# Patient Record
Sex: FEMALE | Race: BLACK OR AFRICAN AMERICAN | NOT HISPANIC OR LATINO | Employment: UNEMPLOYED | URBAN - METROPOLITAN AREA
[De-identification: names, ages, dates, MRNs, and addresses within clinical notes are randomized per-mention and may not be internally consistent; named-entity substitution may affect disease eponyms.]

---

## 2024-02-21 ENCOUNTER — HOSPITAL ENCOUNTER (INPATIENT)
Facility: OTHER | Age: 44
LOS: 5 days | Discharge: HOME | DRG: 872 | End: 2024-02-26
Attending: HOSPITALIST | Admitting: HOSPITALIST
Payer: MEDICAID

## 2024-02-21 DIAGNOSIS — A41.9 SEPSIS: ICD-10-CM

## 2024-02-21 DIAGNOSIS — I45.81 ACQUIRED LONG QT SYNDROME: ICD-10-CM

## 2024-02-21 DIAGNOSIS — A41.9 SEPSIS DUE TO URINARY TRACT INFECTION: ICD-10-CM

## 2024-02-21 DIAGNOSIS — N10 ACUTE PYELONEPHRITIS: Primary | ICD-10-CM

## 2024-02-21 DIAGNOSIS — N39.0 SEPSIS DUE TO URINARY TRACT INFECTION: ICD-10-CM

## 2024-02-21 LAB
ALBUMIN SERPL BCP-MCNC: 2.3 G/DL (ref 3.5–5.2)
ALP SERPL-CCNC: 145 U/L (ref 55–135)
ALT SERPL W/O P-5'-P-CCNC: 39 U/L (ref 10–44)
ANION GAP SERPL CALC-SCNC: 9 MMOL/L (ref 8–16)
AST SERPL-CCNC: 27 U/L (ref 10–40)
B-HCG UR QL: NEGATIVE
BACTERIA #/AREA URNS HPF: ABNORMAL /HPF
BASOPHILS NFR BLD: 0 % (ref 0–1.9)
BILIRUB SERPL-MCNC: 1 MG/DL (ref 0.1–1)
BILIRUB UR QL STRIP: NEGATIVE
BUN SERPL-MCNC: 13 MG/DL (ref 6–20)
CALCIUM SERPL-MCNC: 8.4 MG/DL (ref 8.7–10.5)
CHLORIDE SERPL-SCNC: 110 MMOL/L (ref 95–110)
CLARITY UR: ABNORMAL
CO2 SERPL-SCNC: 22 MMOL/L (ref 23–29)
COLOR UR: YELLOW
CREAT SERPL-MCNC: 1.2 MG/DL (ref 0.5–1.4)
CTP QC/QA: YES
DIFFERENTIAL METHOD BLD: ABNORMAL
EOSINOPHIL NFR BLD: 0 % (ref 0–8)
ERYTHROCYTE [DISTWIDTH] IN BLOOD BY AUTOMATED COUNT: 15.2 % (ref 11.5–14.5)
EST. GFR  (NO RACE VARIABLE): 58 ML/MIN/1.73 M^2
GLUCOSE SERPL-MCNC: 103 MG/DL (ref 70–110)
GLUCOSE UR QL STRIP: NEGATIVE
HCT VFR BLD AUTO: 33.1 % (ref 37–48.5)
HGB BLD-MCNC: 11.1 G/DL (ref 12–16)
HGB UR QL STRIP: ABNORMAL
HYALINE CASTS #/AREA URNS LPF: 2 /LPF
IMM GRANULOCYTES # BLD AUTO: ABNORMAL K/UL (ref 0–0.04)
IMM GRANULOCYTES NFR BLD AUTO: ABNORMAL % (ref 0–0.5)
KETONES UR QL STRIP: NEGATIVE
LACTATE SERPL-SCNC: 1 MMOL/L (ref 0.5–2.2)
LEUKOCYTE ESTERASE UR QL STRIP: ABNORMAL
LIPASE SERPL-CCNC: 3 U/L (ref 4–60)
LYMPHOCYTES NFR BLD: 11 % (ref 18–48)
MCH RBC QN AUTO: 29.5 PG (ref 27–31)
MCHC RBC AUTO-ENTMCNC: 33.5 G/DL (ref 32–36)
MCV RBC AUTO: 88 FL (ref 82–98)
METAMYELOCYTES NFR BLD MANUAL: 1 %
MICROSCOPIC COMMENT: ABNORMAL
MONOCYTES NFR BLD: 3 % (ref 4–15)
NEUTROPHILS NFR BLD: 77 % (ref 38–73)
NEUTS BAND NFR BLD MANUAL: 8 %
NITRITE UR QL STRIP: POSITIVE
NRBC BLD-RTO: 0 /100 WBC
PH UR STRIP: 6 [PH] (ref 5–8)
PLATELET # BLD AUTO: 331 K/UL (ref 150–450)
PMV BLD AUTO: 10.4 FL (ref 9.2–12.9)
POC MOLECULAR INFLUENZA A AGN: NEGATIVE
POC MOLECULAR INFLUENZA B AGN: NEGATIVE
POTASSIUM SERPL-SCNC: 3.6 MMOL/L (ref 3.5–5.1)
PROT SERPL-MCNC: 6.4 G/DL (ref 6–8.4)
PROT UR QL STRIP: ABNORMAL
RBC # BLD AUTO: 3.76 M/UL (ref 4–5.4)
RBC #/AREA URNS HPF: 48 /HPF (ref 0–4)
SARS-COV-2 RDRP RESP QL NAA+PROBE: NEGATIVE
SODIUM SERPL-SCNC: 141 MMOL/L (ref 136–145)
SP GR UR STRIP: 1.01 (ref 1–1.03)
SQUAMOUS #/AREA URNS HPF: 6 /HPF
TOXIC GRANULES BLD QL SMEAR: PRESENT
URN SPEC COLLECT METH UR: ABNORMAL
UROBILINOGEN UR STRIP-ACNC: ABNORMAL EU/DL
WBC # BLD AUTO: 25.22 K/UL (ref 3.9–12.7)
WBC #/AREA URNS HPF: >100 /HPF (ref 0–5)
WBC CASTS #/AREA URNS LPF: 2 /LPF
WBC CLUMPS URNS QL MICRO: ABNORMAL

## 2024-02-21 PROCEDURE — 87502 INFLUENZA DNA AMP PROBE: CPT

## 2024-02-21 PROCEDURE — 83690 ASSAY OF LIPASE: CPT | Performed by: PHYSICIAN ASSISTANT

## 2024-02-21 PROCEDURE — 96375 TX/PRO/DX INJ NEW DRUG ADDON: CPT

## 2024-02-21 PROCEDURE — 12000002 HC ACUTE/MED SURGE SEMI-PRIVATE ROOM

## 2024-02-21 PROCEDURE — 93010 ELECTROCARDIOGRAM REPORT: CPT | Mod: ,,, | Performed by: INTERNAL MEDICINE

## 2024-02-21 PROCEDURE — 63600175 PHARM REV CODE 636 W HCPCS: Performed by: NURSE PRACTITIONER

## 2024-02-21 PROCEDURE — 96361 HYDRATE IV INFUSION ADD-ON: CPT

## 2024-02-21 PROCEDURE — 81025 URINE PREGNANCY TEST: CPT | Performed by: NURSE PRACTITIONER

## 2024-02-21 PROCEDURE — 87154 CUL TYP ID BLD PTHGN 6+ TRGT: CPT | Performed by: PHYSICIAN ASSISTANT

## 2024-02-21 PROCEDURE — 25000003 PHARM REV CODE 250: Performed by: PHYSICIAN ASSISTANT

## 2024-02-21 PROCEDURE — 81000 URINALYSIS NONAUTO W/SCOPE: CPT | Performed by: PHYSICIAN ASSISTANT

## 2024-02-21 PROCEDURE — 80053 COMPREHEN METABOLIC PANEL: CPT | Performed by: PHYSICIAN ASSISTANT

## 2024-02-21 PROCEDURE — 36415 COLL VENOUS BLD VENIPUNCTURE: CPT | Performed by: NURSE PRACTITIONER

## 2024-02-21 PROCEDURE — G0378 HOSPITAL OBSERVATION PER HR: HCPCS

## 2024-02-21 PROCEDURE — 63600175 PHARM REV CODE 636 W HCPCS: Performed by: PHYSICIAN ASSISTANT

## 2024-02-21 PROCEDURE — 99291 CRITICAL CARE FIRST HOUR: CPT

## 2024-02-21 PROCEDURE — 85027 COMPLETE CBC AUTOMATED: CPT | Performed by: NURSE PRACTITIONER

## 2024-02-21 PROCEDURE — 83605 ASSAY OF LACTIC ACID: CPT | Performed by: PHYSICIAN ASSISTANT

## 2024-02-21 PROCEDURE — 25000003 PHARM REV CODE 250: Performed by: NURSE PRACTITIONER

## 2024-02-21 PROCEDURE — 85007 BL SMEAR W/DIFF WBC COUNT: CPT | Performed by: NURSE PRACTITIONER

## 2024-02-21 PROCEDURE — 87088 URINE BACTERIA CULTURE: CPT | Performed by: PHYSICIAN ASSISTANT

## 2024-02-21 PROCEDURE — 96365 THER/PROPH/DIAG IV INF INIT: CPT

## 2024-02-21 PROCEDURE — 87077 CULTURE AEROBIC IDENTIFY: CPT | Performed by: PHYSICIAN ASSISTANT

## 2024-02-21 PROCEDURE — 87635 SARS-COV-2 COVID-19 AMP PRB: CPT | Performed by: NURSE PRACTITIONER

## 2024-02-21 PROCEDURE — 87040 BLOOD CULTURE FOR BACTERIA: CPT | Mod: 59 | Performed by: PHYSICIAN ASSISTANT

## 2024-02-21 PROCEDURE — 93005 ELECTROCARDIOGRAM TRACING: CPT

## 2024-02-21 PROCEDURE — 87186 SC STD MICRODIL/AGAR DIL: CPT | Mod: 59 | Performed by: PHYSICIAN ASSISTANT

## 2024-02-21 PROCEDURE — 87086 URINE CULTURE/COLONY COUNT: CPT | Performed by: PHYSICIAN ASSISTANT

## 2024-02-21 RX ORDER — SODIUM CHLORIDE 0.9 % (FLUSH) 0.9 %
10 SYRINGE (ML) INJECTION EVERY 8 HOURS PRN
Status: DISCONTINUED | OUTPATIENT
Start: 2024-02-22 | End: 2024-02-26 | Stop reason: HOSPADM

## 2024-02-21 RX ORDER — IBUPROFEN 200 MG
16 TABLET ORAL
Status: DISCONTINUED | OUTPATIENT
Start: 2024-02-22 | End: 2024-02-26 | Stop reason: HOSPADM

## 2024-02-21 RX ORDER — ENOXAPARIN SODIUM 100 MG/ML
40 INJECTION SUBCUTANEOUS EVERY 24 HOURS
Status: DISCONTINUED | OUTPATIENT
Start: 2024-02-22 | End: 2024-02-26 | Stop reason: HOSPADM

## 2024-02-21 RX ORDER — KETOROLAC TROMETHAMINE 30 MG/ML
10 INJECTION, SOLUTION INTRAMUSCULAR; INTRAVENOUS
Status: COMPLETED | OUTPATIENT
Start: 2024-02-21 | End: 2024-02-21

## 2024-02-21 RX ORDER — ONDANSETRON HYDROCHLORIDE 2 MG/ML
4 INJECTION, SOLUTION INTRAVENOUS EVERY 8 HOURS PRN
Status: DISCONTINUED | OUTPATIENT
Start: 2024-02-22 | End: 2024-02-26 | Stop reason: HOSPADM

## 2024-02-21 RX ORDER — NALOXONE HCL 0.4 MG/ML
0.02 VIAL (ML) INJECTION
Status: DISCONTINUED | OUTPATIENT
Start: 2024-02-22 | End: 2024-02-26 | Stop reason: HOSPADM

## 2024-02-21 RX ORDER — ACETAMINOPHEN 500 MG
1000 TABLET ORAL
Status: COMPLETED | OUTPATIENT
Start: 2024-02-21 | End: 2024-02-21

## 2024-02-21 RX ORDER — SODIUM CHLORIDE 9 MG/ML
INJECTION, SOLUTION INTRAVENOUS CONTINUOUS
Status: DISCONTINUED | OUTPATIENT
Start: 2024-02-22 | End: 2024-02-22

## 2024-02-21 RX ORDER — GLUCAGON 1 MG
1 KIT INJECTION
Status: DISCONTINUED | OUTPATIENT
Start: 2024-02-22 | End: 2024-02-26 | Stop reason: HOSPADM

## 2024-02-21 RX ORDER — ACETAMINOPHEN 325 MG/1
650 TABLET ORAL EVERY 4 HOURS PRN
Status: DISCONTINUED | OUTPATIENT
Start: 2024-02-22 | End: 2024-02-22

## 2024-02-21 RX ORDER — ONDANSETRON HYDROCHLORIDE 2 MG/ML
4 INJECTION, SOLUTION INTRAVENOUS
Status: COMPLETED | OUTPATIENT
Start: 2024-02-21 | End: 2024-02-21

## 2024-02-21 RX ORDER — IBUPROFEN 200 MG
24 TABLET ORAL
Status: DISCONTINUED | OUTPATIENT
Start: 2024-02-22 | End: 2024-02-26 | Stop reason: HOSPADM

## 2024-02-21 RX ADMIN — SODIUM CHLORIDE 1000 ML: 9 INJECTION, SOLUTION INTRAVENOUS at 07:02

## 2024-02-21 RX ADMIN — ONDANSETRON 4 MG: 2 INJECTION INTRAMUSCULAR; INTRAVENOUS at 07:02

## 2024-02-21 RX ADMIN — PIPERACILLIN AND TAZOBACTAM 4.5 G: 4; .5 INJECTION, POWDER, LYOPHILIZED, FOR SOLUTION INTRAVENOUS; PARENTERAL at 09:02

## 2024-02-21 RX ADMIN — KETOROLAC TROMETHAMINE 10 MG: 30 INJECTION, SOLUTION INTRAMUSCULAR; INTRAVENOUS at 07:02

## 2024-02-21 RX ADMIN — SODIUM CHLORIDE 2313 ML: 9 INJECTION, SOLUTION INTRAVENOUS at 09:02

## 2024-02-21 RX ADMIN — ACETAMINOPHEN 1000 MG: 500 TABLET ORAL at 07:02

## 2024-02-21 NOTE — FIRST PROVIDER EVALUATION
Emergency Department TeleTriage Encounter Note      CHIEF COMPLAINT    Chief Complaint   Patient presents with    General Illness     Pt reporting generalized weakness, HA, subjective fever, chills, and vomiting x 1 week.        VITAL SIGNS   Initial Vitals [02/21/24 1656]   BP Pulse Resp Temp SpO2   (!) 149/73 110 19 (!) 101.5 °F (38.6 °C) 98 %      MAP       --            ALLERGIES    Review of patient's allergies indicates:  No Known Allergies    PROVIDER TRIAGE NOTE  This is a teletriage evaluation of a 43 y.o. female presenting to the ED complaining of fatigue, fever, chills, n/v, and body aches for one week. Denies abd pain.      Pt is alert, no distress.     Initial orders will be placed and care will be transferred to an alternate provider when patient is roomed for a full evaluation. Any additional orders and the final disposition will be determined by that provider.         ORDERS  Labs Reviewed   CBC W/ AUTO DIFFERENTIAL   COMPREHENSIVE METABOLIC PANEL   POCT URINE PREGNANCY   POCT INFLUENZA A/B MOLECULAR   SARS-COV-2 RDRP GENE       ED Orders (720h ago, onward)      Start Ordered     Status Ordering Provider    02/21/24 1730 02/21/24 1717  sodium chloride 0.9% bolus 1,000 mL 1,000 mL  ED 1 Time         Ordered XAVIER VELÁZQUEZ N.    02/21/24 1730 02/21/24 1717  ondansetron injection 4 mg  ED 1 Time         Ordered XAVIER VELÁZQUEZ N.    02/21/24 1717 02/21/24 1717  CBC auto differential  STAT         Ordered XAVIER VELÁZQUEZ N.    02/21/24 1717 02/21/24 1717  Comprehensive metabolic panel  STAT         Ordered XAVIER VELÁZQUEZ N.    02/21/24 1717 02/21/24 1717  Insert Saline lock IV  Once         Ordered XAVIER VELÁZQUEZ N.    02/21/24 1717 02/21/24 1717  POCT urine pregnancy  Once         Ordered XAVIER VELÁZQUEZ N.    02/21/24 1717 02/21/24 1717  POCT Influenza A/B Molecular  Once         Ordered XAVIER VELÁZQUEZ N.    02/21/24 1717 02/21/24 1717  POCT  COVID-19 Rapid Screening  Once         Ordered XAVIER VELÁZQUEZ              Virtual Visit Note: The provider triage portion of this emergency department evaluation and documentation was performed via [employer], a HIPAA-compliant telemedicine application, in concert with a tele-presenter in the room. A face to face patient evaluation with one of my colleagues will occur once the patient is placed in an emergency department room.      DISCLAIMER: This note was prepared with [employer] voice recognition transcription software. Garbled syntax, mangled pronouns, and other bizarre constructions may be attributed to that software system.

## 2024-02-22 PROBLEM — N39.0 SEPSIS DUE TO URINARY TRACT INFECTION: Status: ACTIVE | Noted: 2024-02-21

## 2024-02-22 PROBLEM — F33.1 MODERATE EPISODE OF RECURRENT MAJOR DEPRESSIVE DISORDER: Status: ACTIVE | Noted: 2023-03-07

## 2024-02-22 PROBLEM — I10 ESSENTIAL HYPERTENSION: Status: ACTIVE | Noted: 2023-03-07

## 2024-02-22 LAB
ACINETOBACTER CALCOACETICUS/BAUMANNII COMPLEX: NOT DETECTED
ALBUMIN SERPL BCP-MCNC: 2.2 G/DL (ref 3.5–5.2)
ALP SERPL-CCNC: 171 U/L (ref 55–135)
ALT SERPL W/O P-5'-P-CCNC: 39 U/L (ref 10–44)
ANION GAP SERPL CALC-SCNC: 10 MMOL/L (ref 8–16)
AST SERPL-CCNC: 28 U/L (ref 10–40)
BACTEROIDES FRAGILIS: NOT DETECTED
BASOPHILS NFR BLD: 0 % (ref 0–1.9)
BILIRUB SERPL-MCNC: 1.2 MG/DL (ref 0.1–1)
BUN SERPL-MCNC: 13 MG/DL (ref 6–20)
CALCIUM SERPL-MCNC: 8.6 MG/DL (ref 8.7–10.5)
CANDIDA ALBICANS: NOT DETECTED
CANDIDA AURIS: NOT DETECTED
CANDIDA GLABRATA: NOT DETECTED
CANDIDA KRUSEI: NOT DETECTED
CANDIDA PARAPSILOSIS: NOT DETECTED
CANDIDA TROPICALIS: NOT DETECTED
CHLORIDE SERPL-SCNC: 111 MMOL/L (ref 95–110)
CO2 SERPL-SCNC: 20 MMOL/L (ref 23–29)
CREAT SERPL-MCNC: 1.3 MG/DL (ref 0.5–1.4)
CRYPTOCOCCUS NEOFORMANS/GATTII: NOT DETECTED
CTX-M GENE (ESBL PRODUCER): NOT DETECTED
DIFFERENTIAL METHOD BLD: ABNORMAL
ENTEROBACTER CLOACAE COMPLEX: NOT DETECTED
ENTEROBACTERALES: ABNORMAL
ENTEROCOCCUS FAECALIS: NOT DETECTED
ENTEROCOCCUS FAECIUM: NOT DETECTED
EOSINOPHIL NFR BLD: 0 % (ref 0–8)
ERYTHROCYTE [DISTWIDTH] IN BLOOD BY AUTOMATED COUNT: 15.2 % (ref 11.5–14.5)
ESCHERICHIA COLI: DETECTED
EST. GFR  (NO RACE VARIABLE): 52 ML/MIN/1.73 M^2
GLUCOSE SERPL-MCNC: 100 MG/DL (ref 70–110)
HAEMOPHILUS INFLUENZAE: NOT DETECTED
HCT VFR BLD AUTO: 30.5 % (ref 37–48.5)
HGB BLD-MCNC: 10.1 G/DL (ref 12–16)
IMM GRANULOCYTES # BLD AUTO: ABNORMAL K/UL (ref 0–0.04)
IMM GRANULOCYTES NFR BLD AUTO: ABNORMAL % (ref 0–0.5)
IMP GENE (CARBAPENEM RESISTANT): NOT DETECTED
KLEBSIELLA AEROGENES: NOT DETECTED
KLEBSIELLA OXYTOCA: NOT DETECTED
KLEBSIELLA PNEUMONIAE GROUP: NOT DETECTED
KPC RESISTANCE GENE (CARBAPENEM): NOT DETECTED
LACTATE SERPL-SCNC: 0.7 MMOL/L (ref 0.5–2.2)
LISTERIA MONOCYTOGENES: NOT DETECTED
LYMPHOCYTES NFR BLD: 15 % (ref 18–48)
MAGNESIUM SERPL-MCNC: 1.7 MG/DL (ref 1.6–2.6)
MCH RBC QN AUTO: 29.2 PG (ref 27–31)
MCHC RBC AUTO-ENTMCNC: 33.1 G/DL (ref 32–36)
MCR-1: NOT DETECTED
MCV RBC AUTO: 88 FL (ref 82–98)
MEC A/C AND MREJ (MRSA): ABNORMAL
MEC A/C: ABNORMAL
MONOCYTES NFR BLD: 4 % (ref 4–15)
NDM GENE (CARBAPENEM RESISTANT): NOT DETECTED
NEISSERIA MENINGITIDIS: NOT DETECTED
NEUTROPHILS NFR BLD: 76 % (ref 38–73)
NEUTS BAND NFR BLD MANUAL: 5 %
NRBC BLD-RTO: 0 /100 WBC
OHS QRS DURATION: 70 MS
OHS QTC CALCULATION: 406 MS
OXA-48-LIKE (CARBAPENEM RESISTANT): NOT DETECTED
PHOSPHATE SERPL-MCNC: 3.3 MG/DL (ref 2.7–4.5)
PLATELET # BLD AUTO: 276 K/UL (ref 150–450)
PMV BLD AUTO: 10.2 FL (ref 9.2–12.9)
POTASSIUM SERPL-SCNC: 3.5 MMOL/L (ref 3.5–5.1)
PROT SERPL-MCNC: 6 G/DL (ref 6–8.4)
PROTEUS SPECIES: NOT DETECTED
PSEUDOMONAS AERUGINOSA: NOT DETECTED
RBC # BLD AUTO: 3.46 M/UL (ref 4–5.4)
SALMONELLA SP: NOT DETECTED
SERRATIA MARCESCENS: NOT DETECTED
SODIUM SERPL-SCNC: 141 MMOL/L (ref 136–145)
STAPHYLOCOCCUS AUREUS: NOT DETECTED
STAPHYLOCOCCUS EPIDERMIDIS: NOT DETECTED
STAPHYLOCOCCUS LUGDUNESIS: NOT DETECTED
STAPHYLOCOCCUS SPECIES: NOT DETECTED
STENOTROPHOMONAS MALTOPHILIA: NOT DETECTED
STREPTOCOCCUS AGALACTIAE: NOT DETECTED
STREPTOCOCCUS PNEUMONIAE: NOT DETECTED
STREPTOCOCCUS PYOGENES: NOT DETECTED
STREPTOCOCCUS SPECIES: NOT DETECTED
VAN A/B (VRE GENE): ABNORMAL
VIM GENE (CARBAPENEM RESISTANT): NOT DETECTED
WBC # BLD AUTO: 24.91 K/UL (ref 3.9–12.7)

## 2024-02-22 PROCEDURE — 87040 BLOOD CULTURE FOR BACTERIA: CPT | Performed by: HOSPITALIST

## 2024-02-22 PROCEDURE — 85007 BL SMEAR W/DIFF WBC COUNT: CPT | Performed by: NURSE PRACTITIONER

## 2024-02-22 PROCEDURE — 25000003 PHARM REV CODE 250: Performed by: HOSPITALIST

## 2024-02-22 PROCEDURE — 63600175 PHARM REV CODE 636 W HCPCS: Performed by: HOSPITALIST

## 2024-02-22 PROCEDURE — 25000003 PHARM REV CODE 250: Performed by: NURSE PRACTITIONER

## 2024-02-22 PROCEDURE — 63600175 PHARM REV CODE 636 W HCPCS: Performed by: NURSE PRACTITIONER

## 2024-02-22 PROCEDURE — 85027 COMPLETE CBC AUTOMATED: CPT | Performed by: NURSE PRACTITIONER

## 2024-02-22 PROCEDURE — 11000001 HC ACUTE MED/SURG PRIVATE ROOM

## 2024-02-22 PROCEDURE — 80053 COMPREHEN METABOLIC PANEL: CPT | Performed by: NURSE PRACTITIONER

## 2024-02-22 PROCEDURE — 36415 COLL VENOUS BLD VENIPUNCTURE: CPT | Performed by: HOSPITALIST

## 2024-02-22 PROCEDURE — 83735 ASSAY OF MAGNESIUM: CPT | Performed by: NURSE PRACTITIONER

## 2024-02-22 PROCEDURE — 83605 ASSAY OF LACTIC ACID: CPT | Performed by: NURSE PRACTITIONER

## 2024-02-22 PROCEDURE — 84100 ASSAY OF PHOSPHORUS: CPT | Performed by: NURSE PRACTITIONER

## 2024-02-22 RX ORDER — MORPHINE SULFATE 2 MG/ML
2 INJECTION, SOLUTION INTRAMUSCULAR; INTRAVENOUS EVERY 4 HOURS PRN
Status: DISCONTINUED | OUTPATIENT
Start: 2024-02-22 | End: 2024-02-26 | Stop reason: HOSPADM

## 2024-02-22 RX ORDER — SODIUM CHLORIDE, SODIUM LACTATE, POTASSIUM CHLORIDE, CALCIUM CHLORIDE 600; 310; 30; 20 MG/100ML; MG/100ML; MG/100ML; MG/100ML
INJECTION, SOLUTION INTRAVENOUS CONTINUOUS
Status: DISCONTINUED | OUTPATIENT
Start: 2024-02-22 | End: 2024-02-24

## 2024-02-22 RX ORDER — ACETAMINOPHEN 500 MG
1000 TABLET ORAL EVERY 8 HOURS PRN
Status: DISCONTINUED | OUTPATIENT
Start: 2024-02-22 | End: 2024-02-26 | Stop reason: HOSPADM

## 2024-02-22 RX ADMIN — PIPERACILLIN AND TAZOBACTAM 4.5 G: 4; .5 INJECTION, POWDER, LYOPHILIZED, FOR SOLUTION INTRAVENOUS; PARENTERAL at 11:02

## 2024-02-22 RX ADMIN — ACETAMINOPHEN 650 MG: 325 TABLET, FILM COATED ORAL at 12:02

## 2024-02-22 RX ADMIN — MORPHINE SULFATE 2 MG: 2 INJECTION, SOLUTION INTRAMUSCULAR; INTRAVENOUS at 04:02

## 2024-02-22 RX ADMIN — SODIUM CHLORIDE: 9 INJECTION, SOLUTION INTRAVENOUS at 12:02

## 2024-02-22 RX ADMIN — SODIUM CHLORIDE, POTASSIUM CHLORIDE, SODIUM LACTATE AND CALCIUM CHLORIDE: 600; 310; 30; 20 INJECTION, SOLUTION INTRAVENOUS at 08:02

## 2024-02-22 RX ADMIN — PIPERACILLIN SODIUM AND TAZOBACTAM SODIUM 4.5 G: 4; .5 INJECTION, POWDER, LYOPHILIZED, FOR SOLUTION INTRAVENOUS at 05:02

## 2024-02-22 RX ADMIN — MORPHINE SULFATE 2 MG: 2 INJECTION, SOLUTION INTRAMUSCULAR; INTRAVENOUS at 01:02

## 2024-02-22 RX ADMIN — ENOXAPARIN SODIUM 40 MG: 40 INJECTION SUBCUTANEOUS at 04:02

## 2024-02-22 RX ADMIN — MORPHINE SULFATE 2 MG: 2 INJECTION, SOLUTION INTRAMUSCULAR; INTRAVENOUS at 11:02

## 2024-02-22 RX ADMIN — PIPERACILLIN AND TAZOBACTAM 4.5 G: 4; .5 INJECTION, POWDER, LYOPHILIZED, FOR SOLUTION INTRAVENOUS; PARENTERAL at 04:02

## 2024-02-22 RX ADMIN — SODIUM CHLORIDE: 9 INJECTION, SOLUTION INTRAVENOUS at 05:02

## 2024-02-22 RX ADMIN — ACETAMINOPHEN 650 MG: 325 TABLET, FILM COATED ORAL at 04:02

## 2024-02-22 NOTE — HPI
"Per Deion Leal, SHELBIE:    "The patient is a 43 y.o. female presenting with general illness symptoms.  Patient reports that she began with URI symptoms on Saturday.  Significant other at bedside had similar symptoms.  Denies any known exposure to flu or COVID.  Does report some now productive cough.  States that she is some posttussive emesis.  Also reports that she began with abdominal pain yesterday.  States it is located in the lower but also upper abdomen.  She does also report some dark urine however denies any dysuria hematuria.  On initial evaluation, the patient is febrile, tachycardic, and tachypnic.  WBC is elevated over 25.  CT is positive for bilateral pyelonephritis and will be admitted for further management."  "

## 2024-02-22 NOTE — ASSESSMENT & PLAN NOTE
"CT-  Bilateral perinephric stranding, right greater than left.  Findings can be seen with ascending urinary tract infection, noting that evaluation for pyelonephritis is limited without the use of IV contrast.  Correlate with clinical symptoms and urinalysis.  No renal stones or obstructive uropathy.    See "Sepsis"    "

## 2024-02-22 NOTE — SUBJECTIVE & OBJECTIVE
History reviewed. No pertinent past medical history.    Past Surgical History:   Procedure Laterality Date    bilateral tubal ligation       SECTION, CLASSIC      TUBAL LIGATION         Review of patient's allergies indicates:  No Known Allergies    No current facility-administered medications on file prior to encounter.     Current Outpatient Medications on File Prior to Encounter   Medication Sig    tamsulosin (FLOMAX) 0.4 mg Cp24 Take 1 capsule (0.4 mg total) by mouth once daily.     Family History       Problem Relation (Age of Onset)    Diabetes Mother          Tobacco Use    Smoking status: Not on file    Smokeless tobacco: Not on file   Substance and Sexual Activity    Alcohol use: No    Drug use: Yes     Types: Marijuana    Sexual activity: Not on file     Comment: daily     Review of Systems   Constitutional:  Positive for activity change. Negative for appetite change and fever.   HENT:  Negative for congestion, ear pain, rhinorrhea and sinus pressure.    Eyes:  Negative for pain and discharge.   Respiratory:  Negative for cough, chest tightness, shortness of breath and wheezing.    Cardiovascular:  Negative for chest pain and leg swelling.   Gastrointestinal:  Negative for abdominal distention, abdominal pain, diarrhea, nausea and vomiting.   Endocrine: Negative for cold intolerance and heat intolerance.   Genitourinary:  Positive for flank pain and urgency. Negative for difficulty urinating, frequency and hematuria.   Musculoskeletal:  Positive for back pain. Negative for arthralgias, joint swelling and myalgias.   Allergic/Immunologic: Negative for environmental allergies and food allergies.   Neurological:  Negative for dizziness, weakness, light-headedness and headaches.   Hematological:  Does not bruise/bleed easily.   Psychiatric/Behavioral:  Negative for agitation, behavioral problems and decreased concentration.      Objective:     Vital Signs (Most Recent):  Temp: (!) 101.4 °F (38.6 °C)  (02/22/24 0101)  Pulse: (!) 114 (02/22/24 0101)  Resp: 16 (02/22/24 0101)  BP: 122/73 (02/22/24 0101)  SpO2: 98 % (02/22/24 0101) Vital Signs (24h Range):  Temp:  [100 °F (37.8 °C)-101.5 °F (38.6 °C)] 101.4 °F (38.6 °C)  Pulse:  [] 114  Resp:  [16-19] 16  SpO2:  [98 %-100 %] 98 %  BP: (122-149)/(69-73) 122/73     Weight: 80.6 kg (177 lb 11.1 oz)  Body mass index is 31.48 kg/m².     Physical Exam  Constitutional:       Appearance: Normal appearance. She is well-developed.   HENT:      Head: Normocephalic.   Eyes:      General:         Right eye: No discharge.         Left eye: No discharge.      Conjunctiva/sclera: Conjunctivae normal.   Cardiovascular:      Rate and Rhythm: Regular rhythm. Tachycardia present.      Pulses:           Radial pulses are 2+ on the right side and 2+ on the left side.      Heart sounds: Normal heart sounds.   Pulmonary:      Effort: Pulmonary effort is normal. Tachypnea present. No respiratory distress.      Breath sounds: Normal breath sounds.   Abdominal:      General: Bowel sounds are normal. There is no distension.      Palpations: Abdomen is soft.      Tenderness: There is generalized abdominal tenderness.   Musculoskeletal:         General: Normal range of motion.      Cervical back: Normal range of motion and neck supple.   Skin:     General: Skin is warm and dry.      Coloration: Skin is pale.   Neurological:      Mental Status: She is alert and oriented to person, place, and time.      GCS: GCS eye subscore is 4. GCS verbal subscore is 5. GCS motor subscore is 6.      Motor: Motor function is intact.   Psychiatric:         Mood and Affect: Mood normal.         Speech: Speech normal.         Behavior: Behavior normal.         Thought Content: Thought content normal.                Significant Labs: All pertinent labs within the past 24 hours have been reviewed.  CBC:   Recent Labs   Lab 02/21/24  1934   WBC 25.22*   HGB 11.1*   HCT 33.1*        CMP:   Recent Labs    Lab 02/21/24  2326      K 3.6      CO2 22*      BUN 13   CREATININE 1.2   CALCIUM 8.4*   PROT 6.4   ALBUMIN 2.3*   BILITOT 1.0   ALKPHOS 145*   AST 27   ALT 39   ANIONGAP 9       Significant Imaging: I have reviewed all pertinent imaging results/findings within the past 24 hours.

## 2024-02-22 NOTE — ED TRIAGE NOTES
Mahendra Velasco, an 43 y.o. female presents to the ED with weakness, headache, fevers, chills, nausea, and vomiting for one week.       Chief Complaint   Patient presents with    General Illness     Pt reporting generalized weakness, HA, subjective fever, chills, and vomiting x 1 week.      Review of patient's allergies indicates:  No Known Allergies  No past medical history on file.

## 2024-02-22 NOTE — PLAN OF CARE
[facility] PCP list sent via Teams to Edelmira Maki. NEL Hickey will continue to follow for d/c needs.

## 2024-02-22 NOTE — ASSESSMENT & PLAN NOTE
This patient does have evidence of infective focus  My overall impression is sepsis.  Source: Urinary Tract  Antibiotics given-   Antibiotics (72h ago, onward)      Start     Stop Route Frequency Ordered    02/21/24 2045  piperacillin-tazobactam (ZOSYN) 4.5 g in dextrose 5 % in water (D5W) 100 mL IVPB (MB+)  (ED Adult Sepsis Treatment)         02/22/24 2044 IV Every 8 hours (non-standard times) 02/21/24 2035          Latest lactate reviewed-  Recent Labs   Lab 02/21/24 2027   LACTATE 1.0       Fluid challenge Actual Body weight- Patient will receive 30ml/kg actual body weight to calculate fluid bolus for treatment of septic shock.     Post- resuscitation assessment Yes Perfusion exam was performed within 6 hours of septic shock presentation after bolus shows Adequate tissue perfusion assessed by non-invasive monitoring       Will Not start Pressors- Levophed for MAP of 65  Source control achieved by: abx

## 2024-02-22 NOTE — PLAN OF CARE
Initial Discharge Planning Assessment:  Patient admitted on: 2/21/24     Chart reviewed, Care plan discussed with treatment team,  attending Dr. Mason     PCP updated in Epic: Dr. Carina Emmanuel  Pharmacy, updated in Epic: Bedside     DME at home: None     Current dispo: Home       Transportation: Family     Power of  or Living Will:        Anticipated DC needs from CM perspective:  TBD         02/22/24 0826   Discharge Assessment   Assessment Type Discharge Planning Assessment   Confirmed/corrected address, phone number and insurance Yes   Confirmed Demographics Correct on Facesheet   Source of Information patient;health record   People in Home spouse   Do you expect to return to your current living situation? Yes   Do you have help at home or someone to help you manage your care at home? Yes   Prior to hospitilization cognitive status: Alert/Oriented   Current cognitive status: Alert/Oriented   Walking or Climbing Stairs Difficulty no   Dressing/Bathing Difficulty no   Equipment Currently Used at Home none   How do you get to doctors appointments? car, drives self;family or friend will provide   Are you on dialysis? No   Do you take coumadin? No   Discharge Plan A Home with family   DME Needed Upon Discharge  none   Discharge Plan discussed with: Patient   Transition of Care Barriers None

## 2024-02-22 NOTE — H&P
[facility] Surg [address] Medicine  History & Physical    Patient Name: Mahendra Velasco  MRN: [id number]  Patient Class: OP- Observation  Admission Date: 2024  Attending Physician: Saurav Mason MD   Primary Care Provider: Carina Emmanuel MD         Patient information was obtained from patient, past medical records, and ER records.     Subjective:     Principal Problem:Sepsis    Chief Complaint:   Chief Complaint   Patient presents with    General Illness     Pt reporting generalized weakness, HA, subjective fever, chills, and vomiting x 1 week.         HPI: The patient is a 43 y.o. female presenting with general illness symptoms.  Patient reports that she began with URI symptoms on Saturday.  Significant other at bedside had similar symptoms.  Denies any known exposure to flu or COVID.  Does report some now productive cough.  States that she is some posttussive emesis.  Also reports that she began with abdominal pain yesterday.  States it is located in the lower but also upper abdomen.  She does also report some dark urine however denies any dysuria hematuria.  On initial evaluation, the patient is febrile, tachycardic, and tachypnic.  WBC is elevated over 25.  CT is positive for bilateral pyelonephritis and will be admitted for further management.      History reviewed. No pertinent past medical history.    Past Surgical History:   Procedure Laterality Date    bilateral tubal ligation       SECTION, CLASSIC      TUBAL LIGATION         Review of patient's allergies indicates:  No Known Allergies    No current facility-administered medications on file prior to encounter.     Current Outpatient Medications on File Prior to Encounter   Medication Sig    tamsulosin (FLOMAX) 0.4 mg Cp24 Take 1 capsule (0.4 mg total) by mouth once daily.     Family History       Problem Relation (Age of Onset)    Diabetes Mother          Tobacco Use    Smoking status: Not on file    Smokeless tobacco:  Not on file   Substance and Sexual Activity    Alcohol use: No    Drug use: Yes     Types: Marijuana    Sexual activity: Not on file     Comment: daily     Review of Systems   Constitutional:  Positive for activity change. Negative for appetite change and fever.   HENT:  Negative for congestion, ear pain, rhinorrhea and sinus pressure.    Eyes:  Negative for pain and discharge.   Respiratory:  Negative for cough, chest tightness, shortness of breath and wheezing.    Cardiovascular:  Negative for chest pain and leg swelling.   Gastrointestinal:  Negative for abdominal distention, abdominal pain, diarrhea, nausea and vomiting.   Endocrine: Negative for cold intolerance and heat intolerance.   Genitourinary:  Positive for flank pain and urgency. Negative for difficulty urinating, frequency and hematuria.   Musculoskeletal:  Positive for back pain. Negative for arthralgias, joint swelling and myalgias.   Allergic/Immunologic: Negative for environmental allergies and food allergies.   Neurological:  Negative for dizziness, weakness, light-headedness and headaches.   Hematological:  Does not bruise/bleed easily.   Psychiatric/Behavioral:  Negative for agitation, behavioral problems and decreased concentration.      Objective:     Vital Signs (Most Recent):  Temp: (!) 101.4 °F (38.6 °C) (02/22/24 0101)  Pulse: (!) 114 (02/22/24 0101)  Resp: 16 (02/22/24 0101)  BP: 122/73 (02/22/24 0101)  SpO2: 98 % (02/22/24 0101) Vital Signs (24h Range):  Temp:  [100 °F (37.8 °C)-101.5 °F (38.6 °C)] 101.4 °F (38.6 °C)  Pulse:  [] 114  Resp:  [16-19] 16  SpO2:  [98 %-100 %] 98 %  BP: (122-149)/(69-73) 122/73     Weight: 80.6 kg (177 lb 11.1 oz)  Body mass index is 31.48 kg/m².     Physical Exam  Constitutional:       Appearance: Normal appearance. She is well-developed.   HENT:      Head: Normocephalic.   Eyes:      General:         Right eye: No discharge.         Left eye: No discharge.      Conjunctiva/sclera: Conjunctivae normal.    Cardiovascular:      Rate and Rhythm: Regular rhythm. Tachycardia present.      Pulses:           Radial pulses are 2+ on the right side and 2+ on the left side.      Heart sounds: Normal heart sounds.   Pulmonary:      Effort: Pulmonary effort is normal. Tachypnea present. No respiratory distress.      Breath sounds: Normal breath sounds.   Abdominal:      General: Bowel sounds are normal. There is no distension.      Palpations: Abdomen is soft.      Tenderness: There is generalized abdominal tenderness.   Musculoskeletal:         General: Normal range of motion.      Cervical back: Normal range of motion and neck supple.   Skin:     General: Skin is warm and dry.      Coloration: Skin is pale.   Neurological:      Mental Status: She is alert and oriented to person, place, and time.      GCS: GCS eye subscore is 4. GCS verbal subscore is 5. GCS motor subscore is 6.      Motor: Motor function is intact.   Psychiatric:         Mood and Affect: Mood normal.         Speech: Speech normal.         Behavior: Behavior normal.         Thought Content: Thought content normal.                Significant Labs: All pertinent labs within the past 24 hours have been reviewed.  CBC:   Recent Labs   Lab 02/21/24  1934   WBC 25.22*   HGB 11.1*   HCT 33.1*        CMP:   Recent Labs   Lab 02/21/24  2326      K 3.6      CO2 22*      BUN 13   CREATININE 1.2   CALCIUM 8.4*   PROT 6.4   ALBUMIN 2.3*   BILITOT 1.0   ALKPHOS 145*   AST 27   ALT 39   ANIONGAP 9       Significant Imaging: I have reviewed all pertinent imaging results/findings within the past 24 hours.  Assessment/Plan:     * Sepsis  This patient does have evidence of infective focus  My overall impression is sepsis.  Source: Urinary Tract  Antibiotics given-   Antibiotics (72h ago, onward)      Start     Stop Route Frequency Ordered    02/21/24 2045  piperacillin-tazobactam (ZOSYN) 4.5 g in dextrose 5 % in water (D5W) 100 mL IVPB (MB+)  (ED Adult  "Sepsis Treatment)         02/22/24 2044 IV Every 8 hours (non-standard times) 02/21/24 2035          Latest lactate reviewed-  Recent Labs   Lab 02/21/24 2027   LACTATE 1.0       Fluid challenge Actual Body weight- Patient will receive 30ml/kg actual body weight to calculate fluid bolus for treatment of septic shock.     Post- resuscitation assessment Yes Perfusion exam was performed within 6 hours of septic shock presentation after bolus shows Adequate tissue perfusion assessed by non-invasive monitoring       Will Not start Pressors- Levophed for MAP of 65  Source control achieved by: abx    Pyelonephritis, acute  CT-  Bilateral perinephric stranding, right greater than left.  Findings can be seen with ascending urinary tract infection, noting that evaluation for pyelonephritis is limited without the use of IV contrast.  Correlate with clinical symptoms and urinalysis.  No renal stones or obstructive uropathy.    See "Sepsis"        VTE Risk Mitigation (From admission, onward)           Ordered     enoxaparin injection 40 mg  Daily         02/21/24 2332     IP VTE HIGH RISK PATIENT  Once         02/21/24 2332     Place sequential compression device  Until discontinued         02/21/24 2332                         On 02/22/2024, patient should be placed in hospital observation services under my care in collaboration with Dr. Mason.           Deion Leal, NP  Department of Hospital Medicine  [facility] ([address])          "

## 2024-02-22 NOTE — PLAN OF CARE
Problem: Adult Inpatient Plan of Care  Goal: Plan of Care Review  Outcome: Ongoing, Progressing  Goal: Patient-Specific Goal (Individualized)  Outcome: Ongoing, Progressing  Goal: Absence of Hospital-Acquired Illness or Injury  Outcome: Ongoing, Progressing  Goal: Optimal Comfort and Wellbeing  Outcome: Ongoing, Progressing     Problem: Infection Progression (Sepsis/Septic Shock)  Goal: Absence of Infection Signs and Symptoms  Outcome: Ongoing, Progressing     POC reviewed with patient. All questions and concerns addressed. Fall/safety precautions implemented and maintained. IVF continued. IV abx administered. Pain management provided. No acute events noted this shift. Please see flowsheet for full assessment and vitals. Bed locked in lowest position. Side rails up x2. Call bell within reach.

## 2024-02-22 NOTE — ED PROVIDER NOTES
"     Source of History:  Patient and significant other    Chief complaint:  General Illness (Pt reporting generalized weakness, HA, subjective fever, chills, and vomiting x 1 week. )      HPI:  Mahendra Velasco is a 43 y.o. female presenting with general illness symptoms.  Patient reports that she began with URI symptoms on Saturday.  Significant other at bedside had similar symptoms.  Denies any known exposure to flu or COVID.  Does report some now productive cough.  States that she is some posttussive emesis.  Also reports that she began with abdominal pain yesterday.  States it is located in the lower but also upper abdomen.  She does also report some dark urine however denies any dysuria hematuria.  She has tried prescription Motrin, NyQuil with no significant improvement symptoms.    This is the extent to the patients complaints today here in the emergency department.    ROS: As per HPI     Review of patient's allergies indicates:  No Known Allergies    PMH:  As per HPI and below:  History reviewed. No pertinent past medical history.  Past Surgical History:   Procedure Laterality Date    bilateral tubal ligation       SECTION, CLASSIC      TUBAL LIGATION         Social History     Substance Use Topics    Alcohol use: No    Drug use: Yes     Types: Marijuana       Physical Exam:    BP (!) 149/73 (BP Location: Left arm, Patient Position: Sitting)   Pulse 110   Temp 100 °F (37.8 °C) (Oral)   Resp 19   Ht 5' 3" (1.6 m)   Wt 77.1 kg (170 lb)   SpO2 98%   BMI 30.11 kg/m²   Nursing note and vital signs reviewed.  Constitutional:  Appears ill.  Eyes: No conjunctival injection.Extraocular muscles are intact.  ENT: Oropharynx clear.  Normal phonation.  Mucous membranes dry.  Cardiovascular: Regular rate and rhythm.  No murmurs. No gallops. No rubs  Respiratory: Clear to auscultation bilaterally.  Good air movement.  No wheezes.  No rhonchi. No rales. No accessory muscle use..  Abdomen:  Soft with tenderness " palpation of the suprapubic and right upper quadrant region.  No right lower quadrant tenderness palpation.  No overt Carpenter sign.  No guarding, rebound or rigidity.  No CVA tenderness  Musculoskeletal: Good range of motion all joints.  No deformities.  Neck supple.  No meningismus.  Skin: No rashes seen.  Good turgor.  No abrasions.  No ecchymoses.  Neurologic: Motor intact.  Sensation intact.  No ataxia. No focal neurological deficits.  Psych: Appropriate, conversant    Labs that have been ordered have been independently reviewed and interpreted by myself.    I decided to obtain the patient's medical records.    MDM/ Differential Dx:       Critical Care    Date/Time: 2/21/2024 11:25 PM    Performed by: Carolyn Gates PA  Authorized by: Saurav Mason MD  Direct patient critical care time: 10 minutes  Additional history critical care time: 10 minutes  Ordering / reviewing critical care time: 5 minutes  Documentation critical care time: 5 minutes  Consulting other physicians critical care time: 5 minutes  Total critical care time (exclusive of procedural time) : 35 minutes  Critical care was necessary to treat or prevent imminent or life-threatening deterioration of the following conditions: sepsis.            Mahendra Velasco 43 y.o. presented to the ED with c/o fever. Physical exam reveals patient appears ill.  Mucous membranes to be dry.  Lungs clear to auscultation.  Heart regular rate rhythm.  Abdomen with tenderness of right upper quadrant and suprapubic region.  No guarding, rebound or rigidity.  No overt Carpenter or McBurney point tenderness.    Differential Diagnosis includes, but is not limited to:  Sepsis, bacteremia, UTI, pneumonia, cellulitis, abscess, indwelling line/catheter infection, cholecystitis, viral URI, gastroenteritis, viral syndrome, sinusitis, otitis media/externa, neoplasm, drug reaction, serotonin syndrome, intoxication/withdrawal syndrome.      ED management:  Patient was seen in  initial tele triage process and was noted to be febrile.  Initial labs including influenza and COVID were ordered.  Flu and COVID are negative.  Given the presentation and febrile state high suspicion for sepsis in this workup was initiated including fluid bolus, antipyretic and antiemetic.  Chest x-ray with no focal consolidation.  Given high suspicion of intra-abdominal process antibiotic was selected for antibiotic initiation.  White count noted to be 22 consistent with infectious process.  Giving staffing delays.  Point of care lactic was not readily available.  Lactic was sent in noted to be 1.  Ultrasound reveals no acute cholecystitis.  Incidental finding of gallstones.  Urine consistent infection.  Likely the etiology of sepsis.  Will plan for admission to hospital medicine.  Chemistry was significant delay as multiple episodes of hemolysis.  CT recommended per hospital medicine for obstructive process.  CT with bilateral pyelonephritis.  No obstructive ureterolithiasis at this time    Impression/Plan: Patient informed of diagnosis  The primary encounter diagnosis was Acute pyelonephritis. A diagnosis of Sepsis was also pertinent to this visit.  Will admit to Hospital Medicine    Results for orders placed or performed during the hospital encounter of 02/21/24   CBC auto differential   Result Value Ref Range    WBC 25.22 (H) 3.90 - 12.70 K/uL    RBC 3.76 (L) 4.00 - 5.40 M/uL    Hemoglobin 11.1 (L) 12.0 - 16.0 g/dL    Hematocrit 33.1 (L) 37.0 - 48.5 %    MCV 88 82 - 98 fL    MCH 29.5 27.0 - 31.0 pg    MCHC 33.5 32.0 - 36.0 g/dL    RDW 15.2 (H) 11.5 - 14.5 %    Platelets 331 150 - 450 K/uL    MPV 10.4 9.2 - 12.9 fL    Immature Granulocytes CANCELED 0.0 - 0.5 %    Immature Grans (Abs) CANCELED 0.00 - 0.04 K/uL    nRBC 0 0 /100 WBC    Gran % 77.0 (H) 38.0 - 73.0 %    Lymph % 11.0 (L) 18.0 - 48.0 %    Mono % 3.0 (L) 4.0 - 15.0 %    Eosinophil % 0.0 0.0 - 8.0 %    Basophil % 0.0 0.0 - 1.9 %    Bands 8.0 %     Metamyelocytes 1.0 %    Toxic Granulation Present     Differential Method Manual    Urinalysis, Reflex to Urine Culture Urine, Clean Catch    Specimen: Urine   Result Value Ref Range    Specimen UA Urine, Clean Catch     Color, UA Yellow Yellow, Straw, Samantha    Appearance, UA Hazy (A) Clear    pH, UA 6.0 5.0 - 8.0    Specific Gravity, UA 1.015 1.005 - 1.030    Protein, UA 2+ (A) Negative    Glucose, UA Negative Negative    Ketones, UA Negative Negative    Bilirubin (UA) Negative Negative    Occult Blood UA 3+ (A) Negative    Nitrite, UA Positive (A) Negative    Urobilinogen, UA 4.0-6.0 (A) <2.0 EU/dL    Leukocytes, UA 3+ (A) Negative   Lipase   Result Value Ref Range    Lipase 3 (L) 4 - 60 U/L   Lactic acid, plasma   Result Value Ref Range    Lactate (Lactic Acid) 1.0 0.5 - 2.2 mmol/L   Urinalysis Microscopic   Result Value Ref Range    RBC, UA 48 (H) 0 - 4 /hpf    WBC, UA >100 (H) 0 - 5 /hpf    WBC Clumps, UA Few (A) None-Rare    Bacteria Many (A) None-Occ /hpf    Squam Epithel, UA 6 /hpf    Hyaline Casts, UA 2 (A) 0-1/lpf /lpf    WBC Casts, UA 2 (A) None /lpf    Microscopic Comment SEE COMMENT    POCT urine pregnancy   Result Value Ref Range    POC Preg Test, Ur Negative Negative     Acceptable Yes    POCT Influenza A/B Molecular   Result Value Ref Range    POC Molecular Influenza A Ag Negative Negative, Not Reported    POC Molecular Influenza B Ag Negative Negative, Not Reported     Acceptable Yes    POCT COVID-19 Rapid Screening   Result Value Ref Range    POC Rapid COVID Negative Negative     Acceptable Yes      Imaging Results              CT Renal Stone Study ABD Pelvis WO (Final result)  Result time 02/21/24 23:00:12      Final result by Mandie Morales MD (02/21/24 23:00:12)                   Impression:      1. Bilateral perinephric stranding, right greater than left.  Findings can be seen with ascending urinary tract infection, noting that evaluation for  pyelonephritis is limited without the use of IV contrast.  Correlate with clinical symptoms and urinalysis.  2. No renal stones or obstructive uropathy.  3. Lobular contour of the uterus suggestive for underlying fibroids.      Electronically signed by: Mandie Morales MD  Date:    02/21/2024  Time:    23:00               Narrative:    EXAMINATION:  CT RENAL STONE STUDY ABD PELVIS WO    CLINICAL HISTORY:  Flank pain, kidney stone suspected;    TECHNIQUE:  Low dose axial images, sagittal and coronal reformations were obtained from the lung bases to the pubic symphysis.  Contrast was not administered.    COMPARISON:  Abdominal ultrasound from the same date.  CT abdomen and pelvis from November 2012.    FINDINGS:  The visualized portion of the heart is unremarkable.  Visualized lung bases show no consolidation or pleural effusion.    Liver is enlarged measuring 20.5 cm.  No significant hepatic abnormality seen on this noncontrast exam.  There is no intra-or extrahepatic biliary ductal dilatation.  The gallbladder is unremarkable.  The stomach, pancreas, spleen, and adrenal glands are unremarkable.    Kidneys show no evidence of stones or hydronephrosis.  There is bilateral perinephric stranding, right greater than left.  No stones are seen along the ureteral courses.  Urinary bladder is unremarkable.  Uterus has mild lobular contour suggestive for underlying fibroids.    Appendix is visualized and is unremarkable.  The visualized loops of small and large bowel show no evidence of obstruction or inflammation.  No free air or free fluid.    Aorta tapers normally.    No acute osseous abnormality identified. Subcutaneous soft tissues show no significant abnormalities.                                       US Abdomen Limited (Final result)  Result time 02/21/24 21:06:35      Final result by Mandie Morales MD (02/21/24 21:06:35)                   Impression:      Single gallstone.  No ultrasonographic evidence to suggest  acute cholecystitis.      Electronically signed by: Mandie Morales MD  Date:    02/21/2024  Time:    21:06               Narrative:    EXAMINATION:  US ABDOMEN LIMITED    CLINICAL HISTORY:  RUQ abdominal pain;    TECHNIQUE:  Limited ultrasound of the right upper quadrant of the abdomen was performed.    COMPARISON:  None.    FINDINGS:  Visualized hepatic parenchyma is homogeneous without evidence for masses.  No intra- or extrahepatic biliary ductal dilatation. The common bile duct measures 0.4 cm.  The gallbladder demonstrates a single 1.1 cm shadowing mobile stone.  No evidence of gallbladder wall thickening or pericholecystic fluid.  Sonographic Carpenter's sign is negative. The visualized portion of the pancreas appears normal.  No ascites.                                       X-Ray Chest AP Portable (Final result)  Result time 02/21/24 19:37:38      Final result by Mandie Morales MD (02/21/24 19:37:38)                   Impression:      No acute cardiopulmonary process identified.      Electronically signed by: Mandie Morales MD  Date:    02/21/2024  Time:    19:37               Narrative:    EXAMINATION:  XR CHEST AP PORTABLE    CLINICAL HISTORY:  Sepsis;    TECHNIQUE:  Single frontal view of the chest was performed.    COMPARISON:  None    FINDINGS:  Cardiac silhouette is normal in size.  Lungs are symmetrically expanded.  No evidence of focal consolidative process, pneumothorax, or significant pleural effusion.  No acute osseous abnormality identified.                                                Diagnostic Impression:    1. Acute pyelonephritis    2. Sepsis         ED Disposition Condition    Observation                   Carolyn Gates PA  02/21/24 [id number]

## 2024-02-23 LAB
ANION GAP SERPL CALC-SCNC: 7 MMOL/L (ref 8–16)
BASOPHILS # BLD AUTO: 0.05 K/UL (ref 0–0.2)
BASOPHILS NFR BLD: 0.2 % (ref 0–1.9)
BUN SERPL-MCNC: 10 MG/DL (ref 6–20)
CALCIUM SERPL-MCNC: 8.7 MG/DL (ref 8.7–10.5)
CHLORIDE SERPL-SCNC: 107 MMOL/L (ref 95–110)
CO2 SERPL-SCNC: 23 MMOL/L (ref 23–29)
CREAT SERPL-MCNC: 1.1 MG/DL (ref 0.5–1.4)
DIFFERENTIAL METHOD BLD: ABNORMAL
EOSINOPHIL # BLD AUTO: 0.1 K/UL (ref 0–0.5)
EOSINOPHIL NFR BLD: 0.2 % (ref 0–8)
ERYTHROCYTE [DISTWIDTH] IN BLOOD BY AUTOMATED COUNT: 15 % (ref 11.5–14.5)
EST. GFR  (NO RACE VARIABLE): >60 ML/MIN/1.73 M^2
GLUCOSE SERPL-MCNC: 80 MG/DL (ref 70–110)
HCT VFR BLD AUTO: 29.8 % (ref 37–48.5)
HGB BLD-MCNC: 10 G/DL (ref 12–16)
IMM GRANULOCYTES # BLD AUTO: 0.27 K/UL (ref 0–0.04)
IMM GRANULOCYTES NFR BLD AUTO: 1.3 % (ref 0–0.5)
LACTATE SERPL-SCNC: 0.7 MMOL/L (ref 0.5–2.2)
LYMPHOCYTES # BLD AUTO: 2.9 K/UL (ref 1–4.8)
LYMPHOCYTES NFR BLD: 14.1 % (ref 18–48)
MAGNESIUM SERPL-MCNC: 1.9 MG/DL (ref 1.6–2.6)
MCH RBC QN AUTO: 29.2 PG (ref 27–31)
MCHC RBC AUTO-ENTMCNC: 33.6 G/DL (ref 32–36)
MCV RBC AUTO: 87 FL (ref 82–98)
MONOCYTES # BLD AUTO: 1 K/UL (ref 0.3–1)
MONOCYTES NFR BLD: 4.6 % (ref 4–15)
NEUTROPHILS # BLD AUTO: 16.4 K/UL (ref 1.8–7.7)
NEUTROPHILS NFR BLD: 79.6 % (ref 38–73)
NRBC BLD-RTO: 0 /100 WBC
PHOSPHATE SERPL-MCNC: 2.7 MG/DL (ref 2.7–4.5)
PLATELET # BLD AUTO: 343 K/UL (ref 150–450)
PMV BLD AUTO: 10.4 FL (ref 9.2–12.9)
POTASSIUM SERPL-SCNC: 3.7 MMOL/L (ref 3.5–5.1)
RBC # BLD AUTO: 3.43 M/UL (ref 4–5.4)
SODIUM SERPL-SCNC: 137 MMOL/L (ref 136–145)
WBC # BLD AUTO: 20.64 K/UL (ref 3.9–12.7)

## 2024-02-23 PROCEDURE — 63600175 PHARM REV CODE 636 W HCPCS: Performed by: HOSPITALIST

## 2024-02-23 PROCEDURE — 83605 ASSAY OF LACTIC ACID: CPT | Performed by: HOSPITALIST

## 2024-02-23 PROCEDURE — 63600175 PHARM REV CODE 636 W HCPCS: Performed by: NURSE PRACTITIONER

## 2024-02-23 PROCEDURE — 25500020 PHARM REV CODE 255: Performed by: HOSPITALIST

## 2024-02-23 PROCEDURE — 83735 ASSAY OF MAGNESIUM: CPT | Performed by: HOSPITALIST

## 2024-02-23 PROCEDURE — 11000001 HC ACUTE MED/SURG PRIVATE ROOM

## 2024-02-23 PROCEDURE — 84100 ASSAY OF PHOSPHORUS: CPT | Performed by: HOSPITALIST

## 2024-02-23 PROCEDURE — 36415 COLL VENOUS BLD VENIPUNCTURE: CPT | Performed by: HOSPITALIST

## 2024-02-23 PROCEDURE — 85025 COMPLETE CBC W/AUTO DIFF WBC: CPT | Performed by: HOSPITALIST

## 2024-02-23 PROCEDURE — 25000003 PHARM REV CODE 250: Performed by: HOSPITALIST

## 2024-02-23 PROCEDURE — 99223 1ST HOSP IP/OBS HIGH 75: CPT | Mod: ,,, | Performed by: INTERNAL MEDICINE

## 2024-02-23 PROCEDURE — A9698 NON-RAD CONTRAST MATERIALNOC: HCPCS | Performed by: HOSPITALIST

## 2024-02-23 PROCEDURE — 80048 BASIC METABOLIC PNL TOTAL CA: CPT | Performed by: HOSPITALIST

## 2024-02-23 RX ADMIN — SODIUM CHLORIDE, POTASSIUM CHLORIDE, SODIUM LACTATE AND CALCIUM CHLORIDE: 600; 310; 30; 20 INJECTION, SOLUTION INTRAVENOUS at 01:02

## 2024-02-23 RX ADMIN — IOHEXOL 1000 ML: 9 SOLUTION ORAL at 12:02

## 2024-02-23 RX ADMIN — PIPERACILLIN SODIUM AND TAZOBACTAM SODIUM 4.5 G: 4; .5 INJECTION, POWDER, LYOPHILIZED, FOR SOLUTION INTRAVENOUS at 10:02

## 2024-02-23 RX ADMIN — MORPHINE SULFATE 2 MG: 2 INJECTION, SOLUTION INTRAMUSCULAR; INTRAVENOUS at 01:02

## 2024-02-23 RX ADMIN — MORPHINE SULFATE 2 MG: 2 INJECTION, SOLUTION INTRAMUSCULAR; INTRAVENOUS at 08:02

## 2024-02-23 RX ADMIN — MORPHINE SULFATE 2 MG: 2 INJECTION, SOLUTION INTRAMUSCULAR; INTRAVENOUS at 06:02

## 2024-02-23 RX ADMIN — IOHEXOL 100 ML: 350 INJECTION, SOLUTION INTRAVENOUS at 02:02

## 2024-02-23 RX ADMIN — ENOXAPARIN SODIUM 40 MG: 40 INJECTION SUBCUTANEOUS at 04:02

## 2024-02-23 RX ADMIN — MORPHINE SULFATE 2 MG: 2 INJECTION, SOLUTION INTRAMUSCULAR; INTRAVENOUS at 10:02

## 2024-02-23 RX ADMIN — PIPERACILLIN SODIUM AND TAZOBACTAM SODIUM 4.5 G: 4; .5 INJECTION, POWDER, LYOPHILIZED, FOR SOLUTION INTRAVENOUS at 06:02

## 2024-02-23 RX ADMIN — MORPHINE SULFATE 2 MG: 2 INJECTION, SOLUTION INTRAMUSCULAR; INTRAVENOUS at 04:02

## 2024-02-23 RX ADMIN — SODIUM CHLORIDE, POTASSIUM CHLORIDE, SODIUM LACTATE AND CALCIUM CHLORIDE: 600; 310; 30; 20 INJECTION, SOLUTION INTRAVENOUS at 10:02

## 2024-02-23 RX ADMIN — PIPERACILLIN SODIUM AND TAZOBACTAM SODIUM 4.5 G: 4; .5 INJECTION, POWDER, LYOPHILIZED, FOR SOLUTION INTRAVENOUS at 01:02

## 2024-02-23 NOTE — SUBJECTIVE & OBJECTIVE
Interval History:  Leukocytosis persists and patient continue to have fever.  Blood cultures positive for Gram-negative rods.    Review of Systems   Constitutional:  Positive for fever. Negative for chills.   Respiratory:  Negative for shortness of breath.    Cardiovascular:  Negative for chest pain.   Gastrointestinal:  Negative for abdominal pain, nausea and vomiting.   Genitourinary:  Positive for flank pain. Negative for dysuria and frequency.     Objective:     Vital Signs (Most Recent):  Temp: 98.6 °F (37 °C) (02/23/24 1532)  Pulse: 88 (02/23/24 1532)  Resp: 16 (02/23/24 1620)  BP: (!) 164/92 (02/23/24 1532)  SpO2: 98 % (02/23/24 1532) Vital Signs (24h Range):  Temp:  [96.6 °F (35.9 °C)-99.9 °F (37.7 °C)] 98.6 °F (37 °C)  Pulse:  [83-99] 88  Resp:  [16-20] 16  SpO2:  [94 %-98 %] 98 %  BP: (136-164)/(74-92) 164/92     Weight: 80.6 kg (177 lb 11.1 oz)  Body mass index is 31.48 kg/m².    Intake/Output Summary (Last 24 hours) at 2/23/2024 1711  Last data filed at 2/23/2024 0611  Gross per 24 hour   Intake 2057.12 ml   Output --   Net 2057.12 ml         Physical Exam  Constitutional:       General: She is not in acute distress.  HENT:      Head: Atraumatic.   Eyes:      Conjunctiva/sclera: Conjunctivae normal.   Cardiovascular:      Rate and Rhythm: Normal rate and regular rhythm.      Heart sounds: Normal heart sounds. No murmur heard.  Pulmonary:      Effort: Pulmonary effort is normal.      Breath sounds: Normal breath sounds. No wheezing.   Abdominal:      General: Bowel sounds are normal. There is no distension.      Palpations: Abdomen is soft.      Tenderness: There is no abdominal tenderness. There is right CVA tenderness and left CVA tenderness.   Musculoskeletal:         General: No deformity. Normal range of motion.      Cervical back: Neck supple.   Neurological:      Mental Status: She is alert and oriented to person, place, and time.             Significant Labs: All pertinent labs within the past 24  hours have been reviewed.    Significant Imaging: I have reviewed all pertinent imaging results/findings within the past 24 hours.

## 2024-02-23 NOTE — HOSPITAL COURSE
Patient is a 43-year-old woman admitted with sepsis secondary to pyelonephritis with E. Coli bacteremia.  Patient treated with intravenous piperacillin/tazobactam.  Patient with persistent fever and elevated white blood cell count.  Infectious Disease service consulted and recommended patient continue with intravenous piperacillin tazobactam and to obtain repeat imaging to rule out possible perinephric abscess.  CT negative perinephric process or other intra-abdominal fluid collection.  Improved without further fever improvement of flank pain.  Repeat blood cultures negative.  Leukocytosis improving.  E. coli species pansensitive.  Patient is stable to discharge home to complete a course of oral antibiotics with ciprofloxacin.    Hospital course also notable for development of elevated blood pressure once her sepsis resolved.  Prior medical chart notes suggests history of hypertension however she has not been taking blood pressure medications for some time.  Patient restarted on blood pressure medication was adjusted to achieve reasonable blood pressure control.    Close outpatient follow-up in clinic for monitoring of blood pressure to ensure resolution of her infection.  Patient advised to return to the hospital she develops recurrent fever or worsening pain again.

## 2024-02-23 NOTE — SUBJECTIVE & OBJECTIVE
History reviewed. No pertinent past medical history.    Past Surgical History:   Procedure Laterality Date    bilateral tubal ligation       SECTION, CLASSIC      TUBAL LIGATION         Review of patient's allergies indicates:  No Known Allergies    Medications:  Medications Prior to Admission   Medication Sig    tamsulosin (FLOMAX) 0.4 mg Cp24 Take 1 capsule (0.4 mg total) by mouth once daily.     Antibiotics (From admission, onward)      Start     Stop Route Frequency Ordered    24 1745  piperacillin-tazobactam (ZOSYN) 4.5 g in dextrose 5 % in water (D5W) 100 mL IVPB (MB+)         -- IV Every 8 hours (non-standard times) 24 1640          Antifungals (From admission, onward)      None          Antivirals (From admission, onward)      None               There is no immunization history on file for this patient.    Family History       Problem Relation (Age of Onset)    Diabetes Mother          Social History     Socioeconomic History    Marital status: Single   Substance and Sexual Activity    Alcohol use: No    Drug use: Yes     Types: Marijuana     Review of Systems   Constitutional:  Positive for chills and fever.   Respiratory:  Positive for cough.    Gastrointestinal:  Positive for abdominal pain and nausea.   All other systems reviewed and are negative.    Objective:     Vital Signs (Most Recent):  Temp: 99 °F (37.2 °C) (24 1203)  Pulse: 97 (24 1203)  Resp: 16 (24 1203)  BP: (!) 151/83 (24 1203)  SpO2: 95 % (24 1203) Vital Signs (24h Range):  Temp:  [96.6 °F (35.9 °C)-100.6 °F (38.1 °C)] 99 °F (37.2 °C)  Pulse:  [] 97  Resp:  [16-20] 16  SpO2:  [94 %-98 %] 95 %  BP: (135-152)/(74-90) 151/83     Weight: 80.6 kg (177 lb 11.1 oz)  Body mass index is 31.48 kg/m².    Estimated Creatinine Clearance: 66.3 mL/min (based on SCr of 1.1 mg/dL).     Physical Exam  Vitals and nursing note reviewed.   Constitutional:       General: She is not in acute distress.      Appearance: Normal appearance. She is not ill-appearing, toxic-appearing or diaphoretic.   HENT:      Head: Normocephalic and atraumatic.   Eyes:      Pupils: Pupils are equal, round, and reactive to light.   Cardiovascular:      Rate and Rhythm: Tachycardia present.      Heart sounds: No murmur heard.  Abdominal:      Tenderness: There is abdominal tenderness. There is right CVA tenderness and left CVA tenderness. There is no guarding.      Hernia: No hernia is present.   Skin:     Findings: No erythema or rash.   Neurological:      General: No focal deficit present.      Mental Status: She is alert and oriented to person, place, and time.   Psychiatric:         Mood and Affect: Mood normal.         Behavior: Behavior normal.         Thought Content: Thought content normal.         Judgment: Judgment normal.          Significant Labs: Blood Culture:   Recent Labs   Lab 02/21/24 1935 02/21/24 1947 02/22/24  1650   LABBLOO Gram stain carlos bottle: Gram negative rods  Results called to and read back by:Francine Starkey RN 02/22/2024  17:07  Gram stain aer bottle: Gram negative rods  GRAM NEGATIVE ADAM  Identification pending  For susceptibility see order # [id number]  * Gram stain carlos bottle: Gram negative rods  Results called to and read back by:Francine Starkey RN 02/22/2024  17:43  GRAM NEGATIVE ADAM  Identification and susceptibility pending  * No Growth to date     CBC:   Recent Labs   Lab 02/21/24 1934 02/22/24  0539 02/23/24  0446   WBC 25.22* 24.91* 20.64*   HGB 11.1* 10.1* 10.0*   HCT 33.1* 30.5* 29.8*    276 343     CMP:   Recent Labs   Lab 02/21/24 2326 02/22/24  0539 02/23/24  0446    141 137   K 3.6 3.5 3.7    111* 107   CO2 22* 20* 23    100 80   BUN 13 13 10   CREATININE 1.2 1.3 1.1   CALCIUM 8.4* 8.6* 8.7   PROT 6.4 6.0  --    ALBUMIN 2.3* 2.2*  --    BILITOT 1.0 1.2*  --    ALKPHOS 145* 171*  --    AST 27 28  --    ALT 39 39  --    ANIONGAP 9 10 7*     Procalcitonin: No results  "for input(s): "PROCAL" in the last 48 hours.  Urine Culture:   Recent Labs   Lab 02/21/24 2045   LABURIN GRAM NEGATIVE ADAM  >100,000 cfu/ml  Identification and susceptibility pending  *       Significant Imaging: I have reviewed all pertinent imaging results/findings within the past 24 hours.  "

## 2024-02-23 NOTE — ASSESSMENT & PLAN NOTE
Hemodynamically stable however persistent fever and leukocytosis concerning.  Patient with Gram-negative bacteremia.  Repeat blood cultures obtained.  CT scan abdomen pelvis with intravenous contrast today shows evidence of pyelonephritis but no renal abscess.  Continue intravenous fluids, pain medication, and broad-spectrum antibiotics with piperacillin tazobactam.  Appreciate input by Infectious Disease service.

## 2024-02-23 NOTE — CONSULTS
"[facility] - [facility] ([address])  Infectious Disease  Consult Note    Patient Name: Mahendra Velasco  MRN: [id number]  Admission Date: 2/21/2024  Hospital Length of Stay: 1 days  Attending Physician: Saurav Mason MD  Primary Care Provider: Carina Emmanuel MD     Isolation Status: No active isolations    Patient information was obtained from patient, past medical records, and ER records.      Inpatient consult to Infectious Diseases  Consult performed by: Parviz Marsh MD  Consult ordered by: Saurav Mason MD        Assessment/Plan:       * Sepsis due to urinary tract infection    Ms. Velasco is a pleasant woman admitted with urosepsis, likely E.coli  - clinically stable pending response to abx  - CT renal study was negative for stones  - given persistent fever and leukocytosis, I recommend a contrasted CT, if possible, to r/o perinephric abscess formation  - otherwise, would observe on pip/tazo, despite the BCID, pending final culture results, given her persistent fever  - would repeat blood cultures till clear and trend WBC, in the meantime  - d/w Dr. Mason    Thank you for your consult. I will follow-up with patient. Please contact us if you have any additional questions.    Parviz Marsh MD  Infectious Disease  [facility] - [facility] ([address])    Subjective:     Principal Problem: Sepsis due to urinary tract infection    HPI: Ms. Velasco is a pleasant 44 yo woman admitted with pyelonephritis. She was in her usual state of health until last Staurday when she began to get sick after her fiance has a viral illness. She experienced fever, chills, and abdominal pain. She does report dark urine c/w prior UTIs and bladder "pressure." When she worsened, she came to the ED. In the Ed, she had a significant leukocytosis and pyuria. A renal stone CT was performed demostrating bilateral perinephric stranding c/w bilateral pyelonephritis. She was started on empiric abx. Thus far, her blood cultures " are growing a GNB (E.coli, no resistance, per BCID) as is her urine. She continues to have fever and ID is consulted for that. Over all, the patient is feeling better. It is difficult for her to find a comfortable position to lie in because of her back pain.    History reviewed. No pertinent past medical history.    Past Surgical History:   Procedure Laterality Date    bilateral tubal ligation       SECTION, CLASSIC      TUBAL LIGATION         Review of patient's allergies indicates:  No Known Allergies    Medications:  Medications Prior to Admission   Medication Sig    tamsulosin (FLOMAX) 0.4 mg Cp24 Take 1 capsule (0.4 mg total) by mouth once daily.     Antibiotics (From admission, onward)      Start     Stop Route Frequency Ordered    24 1745  piperacillin-tazobactam (ZOSYN) 4.5 g in dextrose 5 % in water (D5W) 100 mL IVPB (MB+)         -- IV Every 8 hours (non-standard times) 24 1640          Antifungals (From admission, onward)      None          Antivirals (From admission, onward)      None               There is no immunization history on file for this patient.    Family History       Problem Relation (Age of Onset)    Diabetes Mother          Social History     Socioeconomic History    Marital status: Single   Substance and Sexual Activity    Alcohol use: No    Drug use: Yes     Types: Marijuana     Review of Systems   Constitutional:  Positive for chills and fever.   Respiratory:  Positive for cough.    Gastrointestinal:  Positive for abdominal pain and nausea.   All other systems reviewed and are negative.    Objective:     Vital Signs (Most Recent):  Temp: 99 °F (37.2 °C) (24 1203)  Pulse: 97 (24 1203)  Resp: 16 (24 1203)  BP: (!) 151/83 (24 1203)  SpO2: 95 % (24 1203) Vital Signs (24h Range):  Temp:  [96.6 °F (35.9 °C)-100.6 °F (38.1 °C)] 99 °F (37.2 °C)  Pulse:  [] 97  Resp:  [16-20] 16  SpO2:  [94 %-98 %] 95 %  BP: (135-152)/(74-90) 151/83      Weight: 80.6 kg (177 lb 11.1 oz)  Body mass index is 31.48 kg/m².    Estimated Creatinine Clearance: 66.3 mL/min (based on SCr of 1.1 mg/dL).     Physical Exam  Vitals and nursing note reviewed.   Constitutional:       General: She is not in acute distress.     Appearance: Normal appearance. She is not ill-appearing, toxic-appearing or diaphoretic.   HENT:      Head: Normocephalic and atraumatic.   Eyes:      Pupils: Pupils are equal, round, and reactive to light.   Cardiovascular:      Rate and Rhythm: Tachycardia present.      Heart sounds: No murmur heard.  Abdominal:      Tenderness: There is abdominal tenderness. There is right CVA tenderness and left CVA tenderness. There is no guarding.      Hernia: No hernia is present.   Skin:     Findings: No erythema or rash.   Neurological:      General: No focal deficit present.      Mental Status: She is alert and oriented to person, place, and time.   Psychiatric:         Mood and Affect: Mood normal.         Behavior: Behavior normal.         Thought Content: Thought content normal.         Judgment: Judgment normal.          Significant Labs: Blood Culture:   Recent Labs   Lab 02/21/24  1935 02/21/24  1947 02/22/24  1650   LABBLOO Gram stain carlos bottle: Gram negative rods  Results called to and read back by:Francine Starkey RN 02/22/2024  17:07  Gram stain aer bottle: Gram negative rods  GRAM NEGATIVE ADAM  Identification pending  For susceptibility see order # [id number]  * Gram stain carlos bottle: Gram negative rods  Results called to and read back by:Francine Starkey RN 02/22/2024  17:43  GRAM NEGATIVE ADAM  Identification and susceptibility pending  * No Growth to date     CBC:   Recent Labs   Lab 02/21/24  1934 02/22/24  0539 02/23/24  0446   WBC 25.22* 24.91* 20.64*   HGB 11.1* 10.1* 10.0*   HCT 33.1* 30.5* 29.8*    276 343     CMP:   Recent Labs   Lab 02/21/24 2326 02/22/24  0539 02/23/24  0446    141 137   K 3.6 3.5 3.7    111* 107   CO2 22*  "20* 23    100 80   BUN 13 13 10   CREATININE 1.2 1.3 1.1   CALCIUM 8.4* 8.6* 8.7   PROT 6.4 6.0  --    ALBUMIN 2.3* 2.2*  --    BILITOT 1.0 1.2*  --    ALKPHOS 145* 171*  --    AST 27 28  --    ALT 39 39  --    ANIONGAP 9 10 7*     Procalcitonin: No results for input(s): "PROCAL" in the last 48 hours.  Urine Culture:   Recent Labs   Lab 02/21/24 2045   LABURIN GRAM NEGATIVE ADAM  >100,000 cfu/ml  Identification and susceptibility pending  *       Significant Imaging: I have reviewed all pertinent imaging results/findings within the past 24 hours.              "

## 2024-02-23 NOTE — ASSESSMENT & PLAN NOTE
Ms. Velasco is a pleasant woman admitted with urosepsis, likely E.coli  - clinically stable pending response to abx  - CT renal study was negative for stones  - given persistent fever and leukocytosis, I recommend a contrasted CT, if possible, to r/o perinephric abscess formation  - otherwise, would observe on pip/tazo, despite the BCID, pending final culture results, given her persistent fever  - would repeat blood cultures till clear and trend WBC, in the meantime  - d/w Dr. Mason

## 2024-02-23 NOTE — HPI
"Ms. Velasco is a pleasant 44 yo woman admitted with pyelonephritis. She was in her usual state of health until last Staurday when she began to get sick after her fiance has a viral illness. She experienced fever, chills, and abdominal pain. She does report dark urine c/w prior UTIs and bladder "pressure." When she worsened, she came to the ED. In the Ed, she had a significant leukocytosis and pyuria. A renal stone CT was performed demostrating bilateral perinephric stranding c/w bilateral pyelonephritis. She was started on empiric abx. Thus far, her blood cultures are growing a GNB (E.coli, no resistance, per BCID) as is her urine. She continues to have fever and ID is consulted for that. Over all, the patient is feeling better. It is difficult for her to find a comfortable position to lie in because of her back pain.  "

## 2024-02-23 NOTE — PROGRESS NOTES
"[facility] - [address] Medicine  Progress Note    Patient Name: Mahendra Velasco  MRN: [id number]  Patient Class: IP- Inpatient   Admission Date: 2/21/2024  Length of Stay: 1 days  Attending Physician: Saurav Mason MD  Primary Care Provider: Carina Emmanuel MD        Subjective:     Principal Problem:Sepsis due to urinary tract infection        HPI:  Per Deion Leal, NP:    "The patient is a 43 y.o. female presenting with general illness symptoms.  Patient reports that she began with URI symptoms on Saturday.  Significant other at bedside had similar symptoms.  Denies any known exposure to flu or COVID.  Does report some now productive cough.  States that she is some posttussive emesis.  Also reports that she began with abdominal pain yesterday.  States it is located in the lower but also upper abdomen.  She does also report some dark urine however denies any dysuria hematuria.  On initial evaluation, the patient is febrile, tachycardic, and tachypnic.  WBC is elevated over 25.  CT is positive for bilateral pyelonephritis and will be admitted for further management."    Overview/Hospital Course:  Patient is a 43-year-old woman admitted with sepsis secondary to pyelonephritis with Gram-negative bacteremia.  Patient treated with intravenous piperacillin/tazobactam.  Patient with persistent fever and elevated white blood cell count.    Interval History:  Leukocytosis persists and patient continue to have fever.  Blood cultures positive for Gram-negative rods.    Review of Systems   Constitutional:  Positive for fever. Negative for chills.   Respiratory:  Negative for shortness of breath.    Cardiovascular:  Negative for chest pain.   Gastrointestinal:  Negative for abdominal pain, nausea and vomiting.   Genitourinary:  Positive for flank pain. Negative for dysuria and frequency.     Objective:     Vital Signs (Most Recent):  Temp: 98.6 °F (37 °C) (02/23/24 1532)  Pulse: 88 (02/23/24 " 1532)  Resp: 16 (02/23/24 1620)  BP: (!) 164/92 (02/23/24 1532)  SpO2: 98 % (02/23/24 1532) Vital Signs (24h Range):  Temp:  [96.6 °F (35.9 °C)-99.9 °F (37.7 °C)] 98.6 °F (37 °C)  Pulse:  [83-99] 88  Resp:  [16-20] 16  SpO2:  [94 %-98 %] 98 %  BP: (136-164)/(74-92) 164/92     Weight: 80.6 kg (177 lb 11.1 oz)  Body mass index is 31.48 kg/m².    Intake/Output Summary (Last 24 hours) at 2/23/2024 1711  Last data filed at 2/23/2024 0611  Gross per 24 hour   Intake 2057.12 ml   Output --   Net 2057.12 ml         Physical Exam  Constitutional:       General: She is not in acute distress.  HENT:      Head: Atraumatic.   Eyes:      Conjunctiva/sclera: Conjunctivae normal.   Cardiovascular:      Rate and Rhythm: Normal rate and regular rhythm.      Heart sounds: Normal heart sounds. No murmur heard.  Pulmonary:      Effort: Pulmonary effort is normal.      Breath sounds: Normal breath sounds. No wheezing.   Abdominal:      General: Bowel sounds are normal. There is no distension.      Palpations: Abdomen is soft.      Tenderness: There is no abdominal tenderness. There is right CVA tenderness and left CVA tenderness.   Musculoskeletal:         General: No deformity. Normal range of motion.      Cervical back: Neck supple.   Neurological:      Mental Status: She is alert and oriented to person, place, and time.             Significant Labs: All pertinent labs within the past 24 hours have been reviewed.    Significant Imaging: I have reviewed all pertinent imaging results/findings within the past 24 hours.    Assessment/Plan:      * Sepsis due to urinary tract infection  Hemodynamically stable however persistent fever and leukocytosis concerning.  Patient with Gram-negative bacteremia.  Repeat blood cultures obtained.  CT scan abdomen pelvis with intravenous contrast today shows evidence of pyelonephritis but no renal abscess.  Continue intravenous fluids, pain medication, and broad-spectrum antibiotics with piperacillin  tazobactam.  Appreciate input by Infectious Disease service.      VTE Risk Mitigation (From admission, onward)           Ordered     enoxaparin injection 40 mg  Daily         02/21/24 2332     IP VTE HIGH RISK PATIENT  Once         02/21/24 2332     Place sequential compression device  Until discontinued         02/21/24 2332                    Saurav Mason MD  Department of Hospital Medicine   [facility] - [facility] ([address])

## 2024-02-24 LAB
ANION GAP SERPL CALC-SCNC: 9 MMOL/L (ref 8–16)
BACTERIA BLD CULT: ABNORMAL
BACTERIA UR CULT: ABNORMAL
BASOPHILS # BLD AUTO: 0.03 K/UL (ref 0–0.2)
BASOPHILS NFR BLD: 0.2 % (ref 0–1.9)
BUN SERPL-MCNC: 6 MG/DL (ref 6–20)
CALCIUM SERPL-MCNC: 9.1 MG/DL (ref 8.7–10.5)
CHLORIDE SERPL-SCNC: 104 MMOL/L (ref 95–110)
CO2 SERPL-SCNC: 24 MMOL/L (ref 23–29)
CREAT SERPL-MCNC: 0.9 MG/DL (ref 0.5–1.4)
DIFFERENTIAL METHOD BLD: ABNORMAL
EOSINOPHIL # BLD AUTO: 0.1 K/UL (ref 0–0.5)
EOSINOPHIL NFR BLD: 0.7 % (ref 0–8)
ERYTHROCYTE [DISTWIDTH] IN BLOOD BY AUTOMATED COUNT: 15.1 % (ref 11.5–14.5)
EST. GFR  (NO RACE VARIABLE): >60 ML/MIN/1.73 M^2
GLUCOSE SERPL-MCNC: 91 MG/DL (ref 70–110)
HCT VFR BLD AUTO: 29.6 % (ref 37–48.5)
HGB BLD-MCNC: 10.1 G/DL (ref 12–16)
IMM GRANULOCYTES # BLD AUTO: 0.26 K/UL (ref 0–0.04)
IMM GRANULOCYTES NFR BLD AUTO: 1.6 % (ref 0–0.5)
LYMPHOCYTES # BLD AUTO: 3 K/UL (ref 1–4.8)
LYMPHOCYTES NFR BLD: 18.4 % (ref 18–48)
MAGNESIUM SERPL-MCNC: 2 MG/DL (ref 1.6–2.6)
MCH RBC QN AUTO: 29.4 PG (ref 27–31)
MCHC RBC AUTO-ENTMCNC: 34.1 G/DL (ref 32–36)
MCV RBC AUTO: 86 FL (ref 82–98)
MONOCYTES # BLD AUTO: 1.7 K/UL (ref 0.3–1)
MONOCYTES NFR BLD: 10.4 % (ref 4–15)
NEUTROPHILS # BLD AUTO: 11.1 K/UL (ref 1.8–7.7)
NEUTROPHILS NFR BLD: 68.7 % (ref 38–73)
NRBC BLD-RTO: 0 /100 WBC
PHOSPHATE SERPL-MCNC: 3 MG/DL (ref 2.7–4.5)
PLATELET # BLD AUTO: 410 K/UL (ref 150–450)
PMV BLD AUTO: 10.3 FL (ref 9.2–12.9)
POTASSIUM SERPL-SCNC: 3.4 MMOL/L (ref 3.5–5.1)
RBC # BLD AUTO: 3.44 M/UL (ref 4–5.4)
SODIUM SERPL-SCNC: 137 MMOL/L (ref 136–145)
WBC # BLD AUTO: 16.14 K/UL (ref 3.9–12.7)

## 2024-02-24 PROCEDURE — 36415 COLL VENOUS BLD VENIPUNCTURE: CPT | Performed by: HOSPITALIST

## 2024-02-24 PROCEDURE — 84100 ASSAY OF PHOSPHORUS: CPT | Performed by: HOSPITALIST

## 2024-02-24 PROCEDURE — 11000001 HC ACUTE MED/SURG PRIVATE ROOM

## 2024-02-24 PROCEDURE — 63600175 PHARM REV CODE 636 W HCPCS: Performed by: NURSE PRACTITIONER

## 2024-02-24 PROCEDURE — 80048 BASIC METABOLIC PNL TOTAL CA: CPT | Performed by: HOSPITALIST

## 2024-02-24 PROCEDURE — 25000003 PHARM REV CODE 250: Performed by: HOSPITALIST

## 2024-02-24 PROCEDURE — 83735 ASSAY OF MAGNESIUM: CPT | Performed by: HOSPITALIST

## 2024-02-24 PROCEDURE — 85025 COMPLETE CBC W/AUTO DIFF WBC: CPT | Performed by: HOSPITALIST

## 2024-02-24 PROCEDURE — 63600175 PHARM REV CODE 636 W HCPCS: Performed by: HOSPITALIST

## 2024-02-24 RX ORDER — OXYCODONE HYDROCHLORIDE 5 MG/1
5 TABLET ORAL ONCE
Status: COMPLETED | OUTPATIENT
Start: 2024-02-24 | End: 2024-02-24

## 2024-02-24 RX ORDER — POTASSIUM CHLORIDE 20 MEQ/1
40 TABLET, EXTENDED RELEASE ORAL ONCE
Status: COMPLETED | OUTPATIENT
Start: 2024-02-24 | End: 2024-02-24

## 2024-02-24 RX ORDER — OXYCODONE HYDROCHLORIDE 5 MG/1
5 TABLET ORAL EVERY 4 HOURS PRN
Status: DISCONTINUED | OUTPATIENT
Start: 2024-02-24 | End: 2024-02-26 | Stop reason: HOSPADM

## 2024-02-24 RX ADMIN — PIPERACILLIN SODIUM AND TAZOBACTAM SODIUM 4.5 G: 4; .5 INJECTION, POWDER, LYOPHILIZED, FOR SOLUTION INTRAVENOUS at 01:02

## 2024-02-24 RX ADMIN — ENOXAPARIN SODIUM 40 MG: 40 INJECTION SUBCUTANEOUS at 05:02

## 2024-02-24 RX ADMIN — POTASSIUM CHLORIDE 40 MEQ: 1500 TABLET, EXTENDED RELEASE ORAL at 08:02

## 2024-02-24 RX ADMIN — PIPERACILLIN SODIUM AND TAZOBACTAM SODIUM 4.5 G: 4; .5 INJECTION, POWDER, LYOPHILIZED, FOR SOLUTION INTRAVENOUS at 08:02

## 2024-02-24 RX ADMIN — OXYCODONE HYDROCHLORIDE 5 MG: 5 TABLET ORAL at 01:02

## 2024-02-24 RX ADMIN — MORPHINE SULFATE 2 MG: 2 INJECTION, SOLUTION INTRAMUSCULAR; INTRAVENOUS at 03:02

## 2024-02-24 RX ADMIN — CEFTRIAXONE SODIUM 2 G: 2 INJECTION, POWDER, FOR SOLUTION INTRAMUSCULAR; INTRAVENOUS at 02:02

## 2024-02-24 RX ADMIN — OXYCODONE HYDROCHLORIDE 5 MG: 5 TABLET ORAL at 10:02

## 2024-02-24 RX ADMIN — MORPHINE SULFATE 2 MG: 2 INJECTION, SOLUTION INTRAMUSCULAR; INTRAVENOUS at 09:02

## 2024-02-24 RX ADMIN — OXYCODONE HYDROCHLORIDE 5 MG: 5 TABLET ORAL at 05:02

## 2024-02-24 RX ADMIN — ACETAMINOPHEN 1000 MG: 500 TABLET ORAL at 10:02

## 2024-02-24 NOTE — SUBJECTIVE & OBJECTIVE
Interval History:  Leukocytosis trending down.  Last fever 2/22/2024 1629 hours.  Repeat blood cultures negative.    Review of Systems   Constitutional:  Negative for chills and fever.   Respiratory:  Negative for shortness of breath.    Cardiovascular:  Negative for chest pain.   Gastrointestinal:  Negative for abdominal pain, nausea and vomiting.   Genitourinary:  Positive for flank pain. Negative for dysuria and frequency.     Objective:     Vital Signs (Most Recent):  Temp: 98.4 °F (36.9 °C) (02/24/24 1224)  Pulse: 83 (02/24/24 1224)  Resp: 16 (02/24/24 1348)  BP: (!) 181/102 (02/24/24 1224)  SpO2: 97 % (02/24/24 1224) Vital Signs (24h Range):  Temp:  [98.2 °F (36.8 °C)-99.2 °F (37.3 °C)] 98.4 °F (36.9 °C)  Pulse:  [83-88] 83  Resp:  [16-18] 16  SpO2:  [92 %-98 %] 97 %  BP: (137-181)/() 181/102     Weight: 80.6 kg (177 lb 11.1 oz)  Body mass index is 31.48 kg/m².  No intake or output data in the 24 hours ending 02/24/24 1449        Physical Exam  Constitutional:       General: She is not in acute distress.  HENT:      Head: Atraumatic.   Eyes:      Conjunctiva/sclera: Conjunctivae normal.   Cardiovascular:      Rate and Rhythm: Normal rate and regular rhythm.      Heart sounds: Normal heart sounds. No murmur heard.  Pulmonary:      Effort: Pulmonary effort is normal.      Breath sounds: Normal breath sounds. No wheezing.   Abdominal:      General: Bowel sounds are normal. There is no distension.      Palpations: Abdomen is soft.      Tenderness: There is no abdominal tenderness. There is right CVA tenderness and left CVA tenderness.   Musculoskeletal:         General: No deformity. Normal range of motion.      Cervical back: Neck supple.   Neurological:      Mental Status: She is alert and oriented to person, place, and time.             Significant Labs: All pertinent labs within the past 24 hours have been reviewed.    Significant Imaging: I have reviewed all pertinent imaging results/findings within the  past 24 hours.

## 2024-02-24 NOTE — ASSESSMENT & PLAN NOTE
Improving further.  No fever last 24 hours.  Start oral pain medication and attempt to wean off intravenous pain medication.  Blood culture and urine culture positive for E. Coli pansensitive.  Switch to piperacillin/tazobactam to intravenous ceftriaxone.  Repeat blood culture negative.  EKG to reassess QT interval prior to consideration of flouroquinolone.

## 2024-02-24 NOTE — ASSESSMENT & PLAN NOTE
Blood pressure elevated.  Discontinue fluids and monitor.  If blood pressure remains elevated will start pharmacotherapy.

## 2024-02-24 NOTE — PROGRESS NOTES
"[facility] - [address] Medicine  Progress Note    Patient Name: Mahendra Velasco  MRN: [id number]  Patient Class: IP- Inpatient   Admission Date: 2/21/2024  Length of Stay: 2 days  Attending Physician: Saurav Mason MD  Primary Care Provider: Carina Emmanuel MD        Subjective:     Principal Problem:Sepsis due to urinary tract infection        HPI:  Per Deion Leal, NP:    "The patient is a 43 y.o. female presenting with general illness symptoms.  Patient reports that she began with URI symptoms on Saturday.  Significant other at bedside had similar symptoms.  Denies any known exposure to flu or COVID.  Does report some now productive cough.  States that she is some posttussive emesis.  Also reports that she began with abdominal pain yesterday.  States it is located in the lower but also upper abdomen.  She does also report some dark urine however denies any dysuria hematuria.  On initial evaluation, the patient is febrile, tachycardic, and tachypnic.  WBC is elevated over 25.  CT is positive for bilateral pyelonephritis and will be admitted for further management."    Overview/Hospital Course:  Patient is a 43-year-old woman admitted with sepsis secondary to pyelonephritis with Gram-negative bacteremia.  Patient treated with intravenous piperacillin/tazobactam.  Patient with persistent fever and elevated white blood cell count.  Now improving.  Urine and blood cultures positive for E. Coli.  Flank pain improving.    Interval History:  Leukocytosis trending down.  Last fever 2/22/2024 1629 hours.  Repeat blood cultures negative.    Review of Systems   Constitutional:  Negative for chills and fever.   Respiratory:  Negative for shortness of breath.    Cardiovascular:  Negative for chest pain.   Gastrointestinal:  Negative for abdominal pain, nausea and vomiting.   Genitourinary:  Positive for flank pain. Negative for dysuria and frequency.     Objective:     Vital Signs (Most " Recent):  Temp: 98.4 °F (36.9 °C) (02/24/24 1224)  Pulse: 83 (02/24/24 1224)  Resp: 16 (02/24/24 1348)  BP: (!) 181/102 (02/24/24 1224)  SpO2: 97 % (02/24/24 1224) Vital Signs (24h Range):  Temp:  [98.2 °F (36.8 °C)-99.2 °F (37.3 °C)] 98.4 °F (36.9 °C)  Pulse:  [83-88] 83  Resp:  [16-18] 16  SpO2:  [92 %-98 %] 97 %  BP: (137-181)/() 181/102     Weight: 80.6 kg (177 lb 11.1 oz)  Body mass index is 31.48 kg/m².  No intake or output data in the 24 hours ending 02/24/24 1449        Physical Exam  Constitutional:       General: She is not in acute distress.  HENT:      Head: Atraumatic.   Eyes:      Conjunctiva/sclera: Conjunctivae normal.   Cardiovascular:      Rate and Rhythm: Normal rate and regular rhythm.      Heart sounds: Normal heart sounds. No murmur heard.  Pulmonary:      Effort: Pulmonary effort is normal.      Breath sounds: Normal breath sounds. No wheezing.   Abdominal:      General: Bowel sounds are normal. There is no distension.      Palpations: Abdomen is soft.      Tenderness: There is no abdominal tenderness. There is right CVA tenderness and left CVA tenderness.   Musculoskeletal:         General: No deformity. Normal range of motion.      Cervical back: Neck supple.   Neurological:      Mental Status: She is alert and oriented to person, place, and time.             Significant Labs: All pertinent labs within the past 24 hours have been reviewed.    Significant Imaging: I have reviewed all pertinent imaging results/findings within the past 24 hours.    Assessment/Plan:      * Sepsis due to urinary tract infection  Improving further.  No fever last 24 hours.  Start oral pain medication and attempt to wean off intravenous pain medication.  Blood culture and urine culture positive for E. Coli pansensitive.  Switch to piperacillin/tazobactam to intravenous ceftriaxone.  Repeat blood culture negative.  EKG to reassess QT interval prior to consideration of flouroquinolone.    Essential  hypertension  Blood pressure elevated.  Discontinue fluids and monitor.  If blood pressure remains elevated will start pharmacotherapy.      VTE Risk Mitigation (From admission, onward)           Ordered     enoxaparin injection 40 mg  Daily         02/21/24 2332     IP VTE HIGH RISK PATIENT  Once         02/21/24 2332     Place sequential compression device  Until discontinued         02/21/24 2332                    Saurav Mason MD  Department of Hospital Medicine   [facility] - [facility] ([address])

## 2024-02-24 NOTE — PLAN OF CARE
Problem: Adult Inpatient Plan of Care  Goal: Plan of Care Review  Outcome: Ongoing, Progressing  Goal: Patient-Specific Goal (Individualized)  Outcome: Ongoing, Progressing  Goal: Absence of Hospital-Acquired Illness or Injury  Outcome: Ongoing, Progressing  Goal: Optimal Comfort and Wellbeing  Outcome: Ongoing, Progressing  Goal: Readiness for Transition of Care  Outcome: Ongoing, Progressing     Problem: Adjustment to Illness (Sepsis/Septic Shock)  Goal: Optimal Coping  Outcome: Ongoing, Progressing     Problem: Bleeding (Sepsis/Septic Shock)  Goal: Absence of Bleeding  Outcome: Ongoing, Progressing     Problem: Glycemic Control Impaired (Sepsis/Septic Shock)  Goal: Blood Glucose Level Within Desired Range  Outcome: Ongoing, Progressing     Problem: Infection Progression (Sepsis/Septic Shock)  Goal: Absence of Infection Signs and Symptoms  Outcome: Ongoing, Progressing     Problem: Nutrition Impaired (Sepsis/Septic Shock)  Goal: Optimal Nutrition Intake  Outcome: Ongoing, Progressing     Problem: Impaired Wound Healing  Goal: Optimal Wound Healing  Outcome: Ongoing, Progressing

## 2024-02-25 LAB
ANION GAP SERPL CALC-SCNC: 7 MMOL/L (ref 8–16)
BASOPHILS # BLD AUTO: 0.03 K/UL (ref 0–0.2)
BASOPHILS NFR BLD: 0.2 % (ref 0–1.9)
BUN SERPL-MCNC: 5 MG/DL (ref 6–20)
CALCIUM SERPL-MCNC: 9 MG/DL (ref 8.7–10.5)
CHLORIDE SERPL-SCNC: 106 MMOL/L (ref 95–110)
CO2 SERPL-SCNC: 26 MMOL/L (ref 23–29)
CREAT SERPL-MCNC: 0.9 MG/DL (ref 0.5–1.4)
DIFFERENTIAL METHOD BLD: ABNORMAL
EOSINOPHIL # BLD AUTO: 0.1 K/UL (ref 0–0.5)
EOSINOPHIL NFR BLD: 0.7 % (ref 0–8)
ERYTHROCYTE [DISTWIDTH] IN BLOOD BY AUTOMATED COUNT: 15 % (ref 11.5–14.5)
EST. GFR  (NO RACE VARIABLE): >60 ML/MIN/1.73 M^2
GLUCOSE SERPL-MCNC: 87 MG/DL (ref 70–110)
HCT VFR BLD AUTO: 28.7 % (ref 37–48.5)
HGB BLD-MCNC: 9.6 G/DL (ref 12–16)
IMM GRANULOCYTES # BLD AUTO: 0.3 K/UL (ref 0–0.04)
IMM GRANULOCYTES NFR BLD AUTO: 2.3 % (ref 0–0.5)
LYMPHOCYTES # BLD AUTO: 3.4 K/UL (ref 1–4.8)
LYMPHOCYTES NFR BLD: 26.5 % (ref 18–48)
MAGNESIUM SERPL-MCNC: 2 MG/DL (ref 1.6–2.6)
MCH RBC QN AUTO: 28.9 PG (ref 27–31)
MCHC RBC AUTO-ENTMCNC: 33.4 G/DL (ref 32–36)
MCV RBC AUTO: 86 FL (ref 82–98)
MONOCYTES # BLD AUTO: 1.6 K/UL (ref 0.3–1)
MONOCYTES NFR BLD: 12.7 % (ref 4–15)
NEUTROPHILS # BLD AUTO: 7.4 K/UL (ref 1.8–7.7)
NEUTROPHILS NFR BLD: 57.6 % (ref 38–73)
NRBC BLD-RTO: 0 /100 WBC
OHS QRS DURATION: 74 MS
OHS QRS DURATION: 74 MS
OHS QTC CALCULATION: 443 MS
OHS QTC CALCULATION: 449 MS
PHOSPHATE SERPL-MCNC: 3.3 MG/DL (ref 2.7–4.5)
PLATELET # BLD AUTO: 467 K/UL (ref 150–450)
PMV BLD AUTO: 9.8 FL (ref 9.2–12.9)
POTASSIUM SERPL-SCNC: 3.6 MMOL/L (ref 3.5–5.1)
RBC # BLD AUTO: 3.32 M/UL (ref 4–5.4)
SODIUM SERPL-SCNC: 139 MMOL/L (ref 136–145)
WBC # BLD AUTO: 12.84 K/UL (ref 3.9–12.7)

## 2024-02-25 PROCEDURE — 85025 COMPLETE CBC W/AUTO DIFF WBC: CPT | Performed by: HOSPITALIST

## 2024-02-25 PROCEDURE — 36415 COLL VENOUS BLD VENIPUNCTURE: CPT | Performed by: HOSPITALIST

## 2024-02-25 PROCEDURE — 83735 ASSAY OF MAGNESIUM: CPT | Performed by: HOSPITALIST

## 2024-02-25 PROCEDURE — 11000001 HC ACUTE MED/SURG PRIVATE ROOM

## 2024-02-25 PROCEDURE — 93041 RHYTHM ECG TRACING: CPT

## 2024-02-25 PROCEDURE — 25000003 PHARM REV CODE 250: Performed by: HOSPITALIST

## 2024-02-25 PROCEDURE — 25000003 PHARM REV CODE 250: Performed by: NURSE PRACTITIONER

## 2024-02-25 PROCEDURE — 93005 ELECTROCARDIOGRAM TRACING: CPT

## 2024-02-25 PROCEDURE — 80048 BASIC METABOLIC PNL TOTAL CA: CPT | Performed by: HOSPITALIST

## 2024-02-25 PROCEDURE — 84100 ASSAY OF PHOSPHORUS: CPT | Performed by: HOSPITALIST

## 2024-02-25 PROCEDURE — 99900035 HC TECH TIME PER 15 MIN (STAT)

## 2024-02-25 PROCEDURE — 93010 ELECTROCARDIOGRAM REPORT: CPT | Mod: ,,, | Performed by: INTERNAL MEDICINE

## 2024-02-25 PROCEDURE — 63600175 PHARM REV CODE 636 W HCPCS: Performed by: NURSE PRACTITIONER

## 2024-02-25 PROCEDURE — 63600175 PHARM REV CODE 636 W HCPCS: Performed by: HOSPITALIST

## 2024-02-25 RX ORDER — NIFEDIPINE 30 MG/1
30 TABLET, EXTENDED RELEASE ORAL DAILY
Status: DISCONTINUED | OUTPATIENT
Start: 2024-02-25 | End: 2024-02-25

## 2024-02-25 RX ORDER — TALC
6 POWDER (GRAM) TOPICAL NIGHTLY PRN
Status: DISCONTINUED | OUTPATIENT
Start: 2024-02-25 | End: 2024-02-26 | Stop reason: HOSPADM

## 2024-02-25 RX ORDER — CHLORTHALIDONE 25 MG/1
25 TABLET ORAL DAILY
Status: DISCONTINUED | OUTPATIENT
Start: 2024-02-25 | End: 2024-02-26 | Stop reason: HOSPADM

## 2024-02-25 RX ORDER — LOSARTAN POTASSIUM 25 MG/1
25 TABLET ORAL DAILY
Status: DISCONTINUED | OUTPATIENT
Start: 2024-02-26 | End: 2024-02-26 | Stop reason: HOSPADM

## 2024-02-25 RX ORDER — POTASSIUM CHLORIDE 20 MEQ/1
40 TABLET, EXTENDED RELEASE ORAL ONCE
Status: COMPLETED | OUTPATIENT
Start: 2024-02-25 | End: 2024-02-25

## 2024-02-25 RX ORDER — NIFEDIPINE 30 MG/1
30 TABLET, EXTENDED RELEASE ORAL 2 TIMES DAILY
Status: DISCONTINUED | OUTPATIENT
Start: 2024-02-25 | End: 2024-02-26 | Stop reason: HOSPADM

## 2024-02-25 RX ADMIN — NIFEDIPINE 30 MG: 30 TABLET, FILM COATED, EXTENDED RELEASE ORAL at 05:02

## 2024-02-25 RX ADMIN — OXYCODONE HYDROCHLORIDE 5 MG: 5 TABLET ORAL at 05:02

## 2024-02-25 RX ADMIN — Medication 6 MG: at 09:02

## 2024-02-25 RX ADMIN — OXYCODONE HYDROCHLORIDE 5 MG: 5 TABLET ORAL at 12:02

## 2024-02-25 RX ADMIN — ACETAMINOPHEN 1000 MG: 500 TABLET ORAL at 05:02

## 2024-02-25 RX ADMIN — POTASSIUM CHLORIDE 40 MEQ: 1500 TABLET, EXTENDED RELEASE ORAL at 09:02

## 2024-02-25 RX ADMIN — ENOXAPARIN SODIUM 40 MG: 40 INJECTION SUBCUTANEOUS at 05:02

## 2024-02-25 RX ADMIN — NIFEDIPINE 30 MG: 30 TABLET, FILM COATED, EXTENDED RELEASE ORAL at 02:02

## 2024-02-25 RX ADMIN — LOSARTAN POTASSIUM 12.5 MG: 25 TABLET, FILM COATED ORAL at 07:02

## 2024-02-25 RX ADMIN — CHLORTHALIDONE 25 MG: 25 TABLET ORAL at 05:02

## 2024-02-25 RX ADMIN — POTASSIUM CHLORIDE 40 MEQ: 1500 TABLET, EXTENDED RELEASE ORAL at 05:02

## 2024-02-25 RX ADMIN — LOSARTAN POTASSIUM 12.5 MG: 25 TABLET, FILM COATED ORAL at 11:02

## 2024-02-25 RX ADMIN — ACETAMINOPHEN 1000 MG: 500 TABLET ORAL at 02:02

## 2024-02-25 RX ADMIN — CEFTRIAXONE SODIUM 2 G: 2 INJECTION, POWDER, FOR SOLUTION INTRAMUSCULAR; INTRAVENOUS at 02:02

## 2024-02-25 NOTE — SUBJECTIVE & OBJECTIVE
Interval History:  Leukocytosis trending down.  Last fever 2/22/2024 1629 hours.  Repeat blood cultures negative.    Review of Systems   Constitutional:  Negative for chills and fever.   Respiratory:  Negative for shortness of breath.    Cardiovascular:  Negative for chest pain.   Gastrointestinal:  Negative for abdominal pain, nausea and vomiting.   Genitourinary:  Positive for flank pain. Negative for dysuria and frequency.     Objective:     Vital Signs (Most Recent):  Temp: 98.3 °F (36.8 °C) (02/25/24 1145)  Pulse: 86 (02/25/24 1145)  Resp: 17 (02/25/24 1227)  BP: (!) 199/95 (02/25/24 1145)  SpO2: 100 % (02/25/24 1145) Vital Signs (24h Range):  Temp:  [98 °F (36.7 °C)-98.5 °F (36.9 °C)] 98.3 °F (36.8 °C)  Pulse:  [65-86] 86  Resp:  [16-20] 17  SpO2:  [95 %-100 %] 100 %  BP: (168-199)/() 199/95     Weight: 80.6 kg (177 lb 11.1 oz)  Body mass index is 31.48 kg/m².  No intake or output data in the 24 hours ending 02/25/24 1350        Physical Exam  Constitutional:       General: She is not in acute distress.  HENT:      Head: Atraumatic.   Eyes:      Conjunctiva/sclera: Conjunctivae normal.   Cardiovascular:      Rate and Rhythm: Normal rate and regular rhythm.      Heart sounds: Normal heart sounds. No murmur heard.  Pulmonary:      Effort: Pulmonary effort is normal.      Breath sounds: Normal breath sounds. No wheezing.   Abdominal:      General: Bowel sounds are normal. There is no distension.      Palpations: Abdomen is soft.      Tenderness: There is no abdominal tenderness. There is right CVA tenderness and left CVA tenderness.   Musculoskeletal:         General: No deformity. Normal range of motion.      Cervical back: Neck supple.   Neurological:      Mental Status: She is alert and oriented to person, place, and time.             Significant Labs: All pertinent labs within the past 24 hours have been reviewed.    Significant Imaging: I have reviewed all pertinent imaging results/findings within the  past 24 hours.

## 2024-02-25 NOTE — PROGRESS NOTES
"[facility] - [address] Medicine  Progress Note    Patient Name: Mahendra Velasco  MRN: [id number]  Patient Class: IP- Inpatient   Admission Date: 2/21/2024  Length of Stay: 3 days  Attending Physician: Saurav Mason MD  Primary Care Provider: Carina Emmanuel MD        Subjective:     Principal Problem:Sepsis due to urinary tract infection        HPI:  Per Deion Leal, NP:    "The patient is a 43 y.o. female presenting with general illness symptoms.  Patient reports that she began with URI symptoms on Saturday.  Significant other at bedside had similar symptoms.  Denies any known exposure to flu or COVID.  Does report some now productive cough.  States that she is some posttussive emesis.  Also reports that she began with abdominal pain yesterday.  States it is located in the lower but also upper abdomen.  She does also report some dark urine however denies any dysuria hematuria.  On initial evaluation, the patient is febrile, tachycardic, and tachypnic.  WBC is elevated over 25.  CT is positive for bilateral pyelonephritis and will be admitted for further management."    Overview/Hospital Course:  Patient is a 43-year-old woman admitted with sepsis secondary to pyelonephritis with Gram-negative bacteremia.  Patient treated with intravenous piperacillin/tazobactam.  Patient with persistent fever and elevated white blood cell count.  Now improving.  Urine and blood cultures positive for E. Coli.  Flank pain improving.    Interval History:  Leukocytosis trending down.  Last fever 2/22/2024 1629 hours.  Repeat blood cultures negative.    Review of Systems   Constitutional:  Negative for chills and fever.   Respiratory:  Negative for shortness of breath.    Cardiovascular:  Negative for chest pain.   Gastrointestinal:  Negative for abdominal pain, nausea and vomiting.   Genitourinary:  Positive for flank pain. Negative for dysuria and frequency.     Objective:     Vital Signs (Most " Recent):  Temp: 98.3 °F (36.8 °C) (02/25/24 1145)  Pulse: 86 (02/25/24 1145)  Resp: 17 (02/25/24 1227)  BP: (!) 199/95 (02/25/24 1145)  SpO2: 100 % (02/25/24 1145) Vital Signs (24h Range):  Temp:  [98 °F (36.7 °C)-98.5 °F (36.9 °C)] 98.3 °F (36.8 °C)  Pulse:  [65-86] 86  Resp:  [16-20] 17  SpO2:  [95 %-100 %] 100 %  BP: (168-199)/() 199/95     Weight: 80.6 kg (177 lb 11.1 oz)  Body mass index is 31.48 kg/m².  No intake or output data in the 24 hours ending 02/25/24 1350        Physical Exam  Constitutional:       General: She is not in acute distress.  HENT:      Head: Atraumatic.   Eyes:      Conjunctiva/sclera: Conjunctivae normal.   Cardiovascular:      Rate and Rhythm: Normal rate and regular rhythm.      Heart sounds: Normal heart sounds. No murmur heard.  Pulmonary:      Effort: Pulmonary effort is normal.      Breath sounds: Normal breath sounds. No wheezing.   Abdominal:      General: Bowel sounds are normal. There is no distension.      Palpations: Abdomen is soft.      Tenderness: There is no abdominal tenderness. There is right CVA tenderness and left CVA tenderness.   Musculoskeletal:         General: No deformity. Normal range of motion.      Cervical back: Neck supple.   Neurological:      Mental Status: She is alert and oriented to person, place, and time.             Significant Labs: All pertinent labs within the past 24 hours have been reviewed.    Significant Imaging: I have reviewed all pertinent imaging results/findings within the past 24 hours.    Assessment/Plan:      * Sepsis due to urinary tract infection  Improving further.  No fever last 24 hours.  Started oral pain medication and weaning off intravenous pain medication.  Blood culture and urine culture positive for E. Coli pansensitive.  Switched from piperacillin/tazobactam to intravenous ceftriaxone.  Repeat blood culture negative.  QTc wnl.    Essential hypertension  Blood pressure elevated.  Discontinue fluids and monitor.  If  blood pressure remains elevated will start pharmacotherapy.      VTE Risk Mitigation (From admission, onward)           Ordered     enoxaparin injection 40 mg  Daily         02/21/24 2332     IP VTE HIGH RISK PATIENT  Once         02/21/24 2332     Place sequential compression device  Until discontinued         02/21/24 2332                    Saurav Mason MD  Department of Hospital Medicine   [facility] Surg ([address])

## 2024-02-25 NOTE — ASSESSMENT & PLAN NOTE
Improving further.  No fever last 24 hours.  Started oral pain medication and weaning off intravenous pain medication.  Blood culture and urine culture positive for E. Coli pansensitive.  Switched from piperacillin/tazobactam to intravenous ceftriaxone.  Repeat blood culture negative.  QTc wnl.

## 2024-02-26 VITALS
SYSTOLIC BLOOD PRESSURE: 147 MMHG | RESPIRATION RATE: 18 BRPM | HEART RATE: 81 BPM | DIASTOLIC BLOOD PRESSURE: 95 MMHG | HEIGHT: 63 IN | BODY MASS INDEX: 31.48 KG/M2 | OXYGEN SATURATION: 97 % | WEIGHT: 177.69 LBS | TEMPERATURE: 98 F

## 2024-02-26 LAB
ANION GAP SERPL CALC-SCNC: 12 MMOL/L (ref 8–16)
BASOPHILS # BLD AUTO: 0.06 K/UL (ref 0–0.2)
BASOPHILS NFR BLD: 0.5 % (ref 0–1.9)
BUN SERPL-MCNC: 5 MG/DL (ref 6–20)
CALCIUM SERPL-MCNC: 10.1 MG/DL (ref 8.7–10.5)
CHLORIDE SERPL-SCNC: 100 MMOL/L (ref 95–110)
CO2 SERPL-SCNC: 24 MMOL/L (ref 23–29)
CREAT SERPL-MCNC: 0.9 MG/DL (ref 0.5–1.4)
DIFFERENTIAL METHOD BLD: ABNORMAL
EOSINOPHIL # BLD AUTO: 0.1 K/UL (ref 0–0.5)
EOSINOPHIL NFR BLD: 1 % (ref 0–8)
ERYTHROCYTE [DISTWIDTH] IN BLOOD BY AUTOMATED COUNT: 15.2 % (ref 11.5–14.5)
EST. GFR  (NO RACE VARIABLE): >60 ML/MIN/1.73 M^2
GLUCOSE SERPL-MCNC: 112 MG/DL (ref 70–110)
HCT VFR BLD AUTO: 36.5 % (ref 37–48.5)
HGB BLD-MCNC: 12.2 G/DL (ref 12–16)
IMM GRANULOCYTES # BLD AUTO: 0.23 K/UL (ref 0–0.04)
IMM GRANULOCYTES NFR BLD AUTO: 1.7 % (ref 0–0.5)
LYMPHOCYTES # BLD AUTO: 3 K/UL (ref 1–4.8)
LYMPHOCYTES NFR BLD: 22.1 % (ref 18–48)
MAGNESIUM SERPL-MCNC: 2.1 MG/DL (ref 1.6–2.6)
MCH RBC QN AUTO: 29 PG (ref 27–31)
MCHC RBC AUTO-ENTMCNC: 33.4 G/DL (ref 32–36)
MCV RBC AUTO: 87 FL (ref 82–98)
MONOCYTES # BLD AUTO: 1.2 K/UL (ref 0.3–1)
MONOCYTES NFR BLD: 8.6 % (ref 4–15)
NEUTROPHILS # BLD AUTO: 8.8 K/UL (ref 1.8–7.7)
NEUTROPHILS NFR BLD: 66.1 % (ref 38–73)
NRBC BLD-RTO: 0 /100 WBC
PHOSPHATE SERPL-MCNC: 3 MG/DL (ref 2.7–4.5)
PLATELET # BLD AUTO: 598 K/UL (ref 150–450)
PLATELET BLD QL SMEAR: ABNORMAL
PMV BLD AUTO: ABNORMAL FL (ref 9.2–12.9)
POTASSIUM SERPL-SCNC: 4 MMOL/L (ref 3.5–5.1)
RBC # BLD AUTO: 4.21 M/UL (ref 4–5.4)
SODIUM SERPL-SCNC: 136 MMOL/L (ref 136–145)
WBC # BLD AUTO: 13.32 K/UL (ref 3.9–12.7)

## 2024-02-26 PROCEDURE — 84100 ASSAY OF PHOSPHORUS: CPT | Performed by: HOSPITALIST

## 2024-02-26 PROCEDURE — 25000003 PHARM REV CODE 250: Performed by: NURSE PRACTITIONER

## 2024-02-26 PROCEDURE — 80048 BASIC METABOLIC PNL TOTAL CA: CPT | Performed by: HOSPITALIST

## 2024-02-26 PROCEDURE — 25000003 PHARM REV CODE 250: Performed by: HOSPITALIST

## 2024-02-26 PROCEDURE — 83735 ASSAY OF MAGNESIUM: CPT | Performed by: HOSPITALIST

## 2024-02-26 PROCEDURE — 63600175 PHARM REV CODE 636 W HCPCS: Performed by: NURSE PRACTITIONER

## 2024-02-26 PROCEDURE — 99232 SBSQ HOSP IP/OBS MODERATE 35: CPT | Mod: ,,, | Performed by: INTERNAL MEDICINE

## 2024-02-26 PROCEDURE — 36415 COLL VENOUS BLD VENIPUNCTURE: CPT | Performed by: HOSPITALIST

## 2024-02-26 PROCEDURE — 85025 COMPLETE CBC W/AUTO DIFF WBC: CPT | Performed by: HOSPITALIST

## 2024-02-26 RX ORDER — CIPROFLOXACIN 500 MG/1
500 TABLET ORAL EVERY 12 HOURS
Qty: 14 TABLET | Refills: 0 | Status: SHIPPED | OUTPATIENT
Start: 2024-02-26 | End: 2024-03-04

## 2024-02-26 RX ORDER — CHLORTHALIDONE 25 MG/1
25 TABLET ORAL DAILY
Qty: 30 TABLET | Refills: 0 | Status: SHIPPED | OUTPATIENT
Start: 2024-02-26 | End: 2024-03-04 | Stop reason: SDUPTHER

## 2024-02-26 RX ORDER — OXYCODONE HYDROCHLORIDE 5 MG/1
5 TABLET ORAL EVERY 8 HOURS PRN
Qty: 21 TABLET | Refills: 0 | Status: SHIPPED | OUTPATIENT
Start: 2024-02-26 | End: 2024-03-04

## 2024-02-26 RX ORDER — NAPROXEN 500 MG/1
500 TABLET ORAL ONCE
Status: COMPLETED | OUTPATIENT
Start: 2024-02-26 | End: 2024-02-26

## 2024-02-26 RX ORDER — LOSARTAN POTASSIUM 25 MG/1
25 TABLET ORAL DAILY
Qty: 30 TABLET | Refills: 0 | Status: SHIPPED | OUTPATIENT
Start: 2024-02-26 | End: 2024-03-04 | Stop reason: SDUPTHER

## 2024-02-26 RX ORDER — NIFEDIPINE 30 MG/1
30 TABLET, EXTENDED RELEASE ORAL 2 TIMES DAILY
Qty: 60 TABLET | Refills: 0 | Status: SHIPPED | OUTPATIENT
Start: 2024-02-26 | End: 2024-03-04 | Stop reason: SDUPTHER

## 2024-02-26 RX ORDER — ACETAMINOPHEN 500 MG
1000 TABLET ORAL EVERY 8 HOURS PRN
Refills: 0 | COMMUNITY
Start: 2024-02-26

## 2024-02-26 RX ADMIN — LOSARTAN POTASSIUM 25 MG: 25 TABLET, FILM COATED ORAL at 09:02

## 2024-02-26 RX ADMIN — NAPROXEN 500 MG: 500 TABLET ORAL at 09:02

## 2024-02-26 RX ADMIN — CHLORTHALIDONE 25 MG: 25 TABLET ORAL at 09:02

## 2024-02-26 RX ADMIN — NIFEDIPINE 30 MG: 30 TABLET, FILM COATED, EXTENDED RELEASE ORAL at 09:02

## 2024-02-26 RX ADMIN — MORPHINE SULFATE 2 MG: 2 INJECTION, SOLUTION INTRAMUSCULAR; INTRAVENOUS at 01:02

## 2024-02-26 NOTE — PROGRESS NOTES
"[facility] Surg ([address])  Infectious Disease  Progress Note    Patient Name: Mahendra Velasco  MRN: [id number]  Admission Date: 2/21/2024  Length of Stay: 4 days  Attending Physician: Saurav Mason MD  Primary Care Provider: Carina Emmanuel MD    Isolation Status: No active isolations  Assessment/Plan:      ID  * Sepsis due to urinary tract infection  Ms. Velasco is a pleasant woman admitted with urosepsis, likely E.coli  - clinically stable pending response to abx  - CT renal study was negative for stones  - change to oral ciprofloxacin until 3/7  - discussed avoiding calcium co-administration  - d/w Dr. Mason        Anticipated Disposition: home    Thank you for your consult. I will sign off. Please contact us if you have any additional questions.    Julian Acevedo MD  Infectious Disease  [facility] Surg ([address])    Subjective:     Principal Problem:Sepsis due to urinary tract infection    HPI: Ms. Velasco is a pleasant 42 yo woman admitted with pyelonephritis. She was in her usual state of health until last Staurday when she began to get sick after her fiance has a viral illness. She experienced fever, chills, and abdominal pain. She does report dark urine c/w prior UTIs and bladder "pressure." When she worsened, she came to the ED. In the Ed, she had a significant leukocytosis and pyuria. A renal stone CT was performed demostrating bilateral perinephric stranding c/w bilateral pyelonephritis. She was started on empiric abx. Thus far, her blood cultures are growing a GNB (E.coli, no resistance, per BCID) as is her urine. She continues to have fever and ID is consulted for that. Over all, the patient is feeling better. It is difficult for her to find a comfortable position to lie in because of her back pain.  Interval History: Still having sweats    Review of Systems   All other systems reviewed and are negative.    Objective:     Vital Signs (Most Recent):  Temp: 98.3 °F (36.8 °C) (02/26/24 " 1126)  Pulse: 81 (02/26/24 1126)  Resp: 18 (02/26/24 1126)  BP: (!) 147/95 (02/26/24 1126)  SpO2: 97 % (02/26/24 1126) Vital Signs (24h Range):  Temp:  [98 °F (36.7 °C)-99.2 °F (37.3 °C)] 98.3 °F (36.8 °C)  Pulse:  [] 81  Resp:  [17-20] 18  SpO2:  [95 %-99 %] 97 %  BP: (136-213)/() 147/95     Weight: 80.6 kg (177 lb 11.1 oz)  Body mass index is 31.48 kg/m².    Estimated Creatinine Clearance: 81.1 mL/min (based on SCr of 0.9 mg/dL).     Physical Exam  Vitals and nursing note reviewed.   Constitutional:       Appearance: Normal appearance.   HENT:      Head: Normocephalic and atraumatic.   Eyes:      Conjunctiva/sclera: Conjunctivae normal.      Pupils: Pupils are equal, round, and reactive to light.   Cardiovascular:      Rate and Rhythm: Normal rate and regular rhythm.   Pulmonary:      Effort: Pulmonary effort is normal.      Breath sounds: Normal breath sounds.   Abdominal:      General: Abdomen is flat. Bowel sounds are normal.      Tenderness: There is no right CVA tenderness or left CVA tenderness.   Musculoskeletal:         General: Normal range of motion.      Right lower leg: No edema.      Left lower leg: No edema.   Skin:     General: Skin is warm.   Neurological:      Mental Status: She is alert.          Significant Labs: Blood Culture:   Recent Labs   Lab 02/21/24  1935 02/21/24  1947 02/22/24  1650   LABBLOO Gram stain carlos bottle: Gram negative rods  Results called to and read back by:Francine Starkey RN 02/22/2024  17:07  Gram stain aer bottle: Gram negative rods  ESCHERICHIA COLI  For susceptibility see order # [id number]  * Gram stain carlos bottle: Gram negative rods  Results called to and read back by:Francine Starkey RN 02/22/2024  17:43  ESCHERICHIA COLI* No Growth to date  No Growth to date  No Growth to date  No Growth to date     CBC:   Recent Labs   Lab 02/25/24  0321 02/26/24  0346   WBC 12.84* 13.32*   HGB 9.6* 12.2   HCT 28.7* 36.5*   * 598*     CMP:   Recent Labs   Lab  02/25/24  0321 02/26/24  0453    136   K 3.6 4.0    100   CO2 26 24   GLU 87 112*   BUN 5* 5*   CREATININE 0.9 0.9   CALCIUM 9.0 10.1   ANIONGAP 7* 12     Urine Culture:   Recent Labs   Lab 02/21/24 2045   LABURIN ESCHERICHIA COLI  >100,000 cfu/ml  *     Urine Studies:   Recent Labs   Lab 02/21/24 2045   COLORU Yellow   APPEARANCEUA Hazy*   PHUR 6.0   SPECGRAV 1.015   PROTEINUA 2+*   GLUCUA Negative   KETONESU Negative   BILIRUBINUA Negative   OCCULTUA 3+*   NITRITE Positive*   UROBILINOGEN 4.0-6.0*   LEUKOCYTESUR 3+*   RBCUA 48*   WBCUA >100*   BACTERIA Many*   SQUAMEPITHEL 6   HYALINECASTS 2*       Significant Imaging: I have reviewed all pertinent imaging results/findings within the past 24 hours.

## 2024-02-26 NOTE — SUBJECTIVE & OBJECTIVE
Interval History: Still having sweats    Review of Systems   All other systems reviewed and are negative.    Objective:     Vital Signs (Most Recent):  Temp: 98.3 °F (36.8 °C) (02/26/24 1126)  Pulse: 81 (02/26/24 1126)  Resp: 18 (02/26/24 1126)  BP: (!) 147/95 (02/26/24 1126)  SpO2: 97 % (02/26/24 1126) Vital Signs (24h Range):  Temp:  [98 °F (36.7 °C)-99.2 °F (37.3 °C)] 98.3 °F (36.8 °C)  Pulse:  [] 81  Resp:  [17-20] 18  SpO2:  [95 %-99 %] 97 %  BP: (136-213)/() 147/95     Weight: 80.6 kg (177 lb 11.1 oz)  Body mass index is 31.48 kg/m².    Estimated Creatinine Clearance: 81.1 mL/min (based on SCr of 0.9 mg/dL).     Physical Exam  Vitals and nursing note reviewed.   Constitutional:       Appearance: Normal appearance.   HENT:      Head: Normocephalic and atraumatic.   Eyes:      Conjunctiva/sclera: Conjunctivae normal.      Pupils: Pupils are equal, round, and reactive to light.   Cardiovascular:      Rate and Rhythm: Normal rate and regular rhythm.   Pulmonary:      Effort: Pulmonary effort is normal.      Breath sounds: Normal breath sounds.   Abdominal:      General: Abdomen is flat. Bowel sounds are normal.      Tenderness: There is no right CVA tenderness or left CVA tenderness.   Musculoskeletal:         General: Normal range of motion.      Right lower leg: No edema.      Left lower leg: No edema.   Skin:     General: Skin is warm.   Neurological:      Mental Status: She is alert.          Significant Labs: Blood Culture:   Recent Labs   Lab 02/21/24  1935 02/21/24  1947 02/22/24  1650   LABBLOO Gram stain carlos bottle: Gram negative rods  Results called to and read back by:Francine Starkey RN 02/22/2024  17:07  Gram stain aer bottle: Gram negative rods  ESCHERICHIA COLI  For susceptibility see order # [id number]  * Gram stain carlos bottle: Gram negative rods  Results called to and read back by:Francine Starkey RN 02/22/2024  17:43  ESCHERICHIA COLI* No Growth to date  No Growth to date  No Growth to  date  No Growth to date     CBC:   Recent Labs   Lab 02/25/24 0321 02/26/24  0346   WBC 12.84* 13.32*   HGB 9.6* 12.2   HCT 28.7* 36.5*   * 598*     CMP:   Recent Labs   Lab 02/25/24 0321 02/26/24  0453    136   K 3.6 4.0    100   CO2 26 24   GLU 87 112*   BUN 5* 5*   CREATININE 0.9 0.9   CALCIUM 9.0 10.1   ANIONGAP 7* 12     Urine Culture:   Recent Labs   Lab 02/21/24 2045   LABURIN ESCHERICHIA COLI  >100,000 cfu/ml  *     Urine Studies:   Recent Labs   Lab 02/21/24 2045   COLORU Yellow   APPEARANCEUA Hazy*   PHUR 6.0   SPECGRAV 1.015   PROTEINUA 2+*   GLUCUA Negative   KETONESU Negative   BILIRUBINUA Negative   OCCULTUA 3+*   NITRITE Positive*   UROBILINOGEN 4.0-6.0*   LEUKOCYTESUR 3+*   RBCUA 48*   WBCUA >100*   BACTERIA Many*   SQUAMEPITHEL 6   HYALINECASTS 2*       Significant Imaging: I have reviewed all pertinent imaging results/findings within the past 24 hours.

## 2024-02-26 NOTE — ASSESSMENT & PLAN NOTE
Ms. Velasco is a pleasant woman admitted with urosepsis, likely E.coli  - clinically stable pending response to abx  - CT renal study was negative for stones  - change to oral ciprofloxacin until 3/7  - discussed avoiding calcium co-administration  - d/w Dr. Mason

## 2024-02-26 NOTE — DISCHARGE SUMMARY
"[facility] - [facility] Surg [address] Medicine  Discharge Summary      Patient Name: Mahendra Velasco  MRN: [id number]  KATHY: [id number]  Patient Class: IP- Inpatient  Admission Date: 2/21/2024  Hospital Length of Stay: 4 days  Discharge Date and Time: 2/26/2024  1:10 PM  Attending Physician: Saurav Mason MD  Discharging Provider: Saurav Mason MD  Primary Care Provider: Carina Emmanuel MD      HPI:   Per Deion Leal, NP:    "The patient is a 43 y.o. female presenting with general illness symptoms.  Patient reports that she began with URI symptoms on Saturday.  Significant other at bedside had similar symptoms.  Denies any known exposure to flu or COVID.  Does report some now productive cough.  States that she is some posttussive emesis.  Also reports that she began with abdominal pain yesterday.  States it is located in the lower but also upper abdomen.  She does also report some dark urine however denies any dysuria hematuria.  On initial evaluation, the patient is febrile, tachycardic, and tachypnic.  WBC is elevated over 25.  CT is positive for bilateral pyelonephritis and will be admitted for further management."    Hospital Course:   Patient is a 43-year-old woman admitted with sepsis secondary to pyelonephritis with E. Coli bacteremia.  Patient treated with intravenous piperacillin/tazobactam.  Patient with persistent fever and elevated white blood cell count.  Infectious Disease service consulted and recommended patient continue with intravenous piperacillin tazobactam and to obtain repeat imaging to rule out possible perinephric abscess.  CT negative perinephric process or other intra-abdominal fluid collection.  Improved without further fever improvement of flank pain.  Repeat blood cultures negative.  Leukocytosis improving.  E. coli species pansensitive.  Patient is stable to discharge home to complete a course of oral antibiotics with ciprofloxacin.    Hospital course also notable for " development of elevated blood pressure once her sepsis resolved.  Prior medical chart notes suggests history of hypertension however she has not been taking blood pressure medications for some time.  Patient restarted on blood pressure medication was adjusted to achieve reasonable blood pressure control.    Close outpatient follow-up in clinic for monitoring of blood pressure to ensure resolution of her infection.  Patient advised to return to the hospital she develops recurrent fever or worsening pain again.     Goals of Care Treatment Preferences:  Code Status: Full Code      Consults:   Consults (From admission, onward)          Status Ordering Provider     Inpatient consult to Infectious Diseases  Once        Provider:  Parviz Marsh MD    Completed WILLEM SPICER            Final Active Diagnoses:    Diagnosis Date Noted POA    PRINCIPAL PROBLEM:  Sepsis due to urinary tract infection [A41.9, N39.0] 02/21/2024 Yes    Essential hypertension [I10] 03/07/2023 Yes      Problems Resolved During this Admission:       Discharged Condition: Stable    Disposition: Home or Self Care    Follow Up:   Follow-up Information       [facility]    Contact information:  [address] [facility] [address] [phone]               [facility]. Go to.    Why: Apt is scheduled for 1100 at the [facility] location, after that initial visit you will be able to go to the [facility].  Contact information:  [address] [facility] [address]                         Patient Instructions:      Diet Adult Regular     Notify your health care provider if you experience any of the following:  temperature >100.4     Notify your health care provider if you experience any of the following:  persistent nausea and vomiting or diarrhea     Notify your health care provider if you experience any of the following:  severe uncontrolled  pain     Notify your health care provider if you experience any of the following:  difficulty breathing or increased cough     Notify your health care provider if you experience any of the following:  severe persistent headache     Notify your health care provider if you experience any of the following:  worsening rash     Notify your health care provider if you experience any of the following:  persistent dizziness, light-headedness, or visual disturbances     Notify your health care provider if you experience any of the following:  increased confusion or weakness     Activity as tolerated          Medications:  Reconciled Home Medications:      Medication List        START taking these medications      acetaminophen 500 MG tablet  Commonly known as: TYLENOL  Take 2 tablets (1,000 mg total) by mouth every 8 (eight) hours as needed for Pain.     chlorthalidone 25 MG Tab  Commonly known as: HYGROTEN  Take 1 tablet (25 mg total) by mouth once daily.     ciprofloxacin HCl 500 MG tablet  Commonly known as: CIPRO  Take 1 tablet (500 mg total) by mouth every 12 (twelve) hours. for 7 days     losartan 25 MG tablet  Commonly known as: COZAAR  Take 1 tablet (25 mg total) by mouth once daily.     NIFEdipine 30 MG (OSM) 24 hr tablet  Commonly known as: PROCARDIA-XL  Take 1 tablet (30 mg total) by mouth 2 (two) times a day.     oxyCODONE 5 MG immediate release tablet  Commonly known as: ROXICODONE  Take 1 tablet (5 mg total) by mouth every 8 (eight) hours as needed (Pain not controlled by acetamiophen).            STOP taking these medications      tamsulosin 0.4 mg Cap  Commonly known as: FLOMAX              Indwelling Lines/Drains at time of discharge:   Lines/Drains/Airways       None                   Time spent on the discharge of patient: 35 minutes         Saurav Mason MD  Department of Hospital Medicine  [facility] S[facility] ([address])

## 2024-02-26 NOTE — PLAN OF CARE
Problem: Adult Inpatient Plan of Care  Goal: Plan of Care Review  Outcome: Met  Goal: Patient-Specific Goal (Individualized)  Outcome: Met  Goal: Absence of Hospital-Acquired Illness or Injury  Outcome: Met  Goal: Optimal Comfort and Wellbeing  Outcome: Met  Goal: Readiness for Transition of Care  Outcome: Met     Problem: Adjustment to Illness (Sepsis/Septic Shock)  Goal: Optimal Coping  Outcome: Met     Problem: Bleeding (Sepsis/Septic Shock)  Goal: Absence of Bleeding  Outcome: Met     Problem: Glycemic Control Impaired (Sepsis/Septic Shock)  Goal: Blood Glucose Level Within Desired Range  Outcome: Met     Problem: Infection Progression (Sepsis/Septic Shock)  Goal: Absence of Infection Signs and Symptoms  Outcome: Met     Problem: Nutrition Impaired (Sepsis/Septic Shock)  Goal: Optimal Nutrition Intake  Outcome: Met     Problem: Impaired Wound Healing  Goal: Optimal Wound Healing  Outcome: Met

## 2024-02-26 NOTE — PLAN OF CARE
CM met with pt for final discharge planning assessment. Pt is ready for discharge home today.    Family to provide transportation home.    Meds delivered to bedside from [facility] prior to discharge.    Pt has a follow-up apt scheduled, information is in AVS.    Pt is ready for discharge from CM perspective.   02/26/24 1408   Final Note   Assessment Type Final Discharge Note   Anticipated Discharge Disposition Home   Hospital Resources/Appts/Education Provided Provided patient/caregiver with written discharge plan information;Provided education on problems/symptoms using teachback;Appointments scheduled and added to AVS   Post-Acute Status   Discharge Delays None known at this time     [facility] ([address])  Discharge Final Note    Primary Care Provider: Carina Emmanuel MD    Expected Discharge Date: 2/26/2024    Final Discharge Note (most recent)       Final Note - 02/26/24 1408          Final Note    Assessment Type Final Discharge Note     Anticipated Discharge Disposition Home or Self Care     Hospital Resources/Appts/Education Provided Provided patient/caregiver with written discharge plan information;Provided education on problems/symptoms using teachback;Appointments scheduled and added to AVS        Post-Acute Status    Discharge Delays None known at this time                     Important Message from Medicare             Contact Info       [facility]    [address] [facility] [address]   Phone: [phone]       Next Steps: Follow up    [facility]    [address] [facility] [address]       Next Steps: Go to    Instructions: Apt is scheduled for 1100 at the [facility] location, after that initial visit you will be able to go to the [facility].

## 2024-02-27 LAB — BACTERIA BLD CULT: NORMAL

## 2024-03-04 ENCOUNTER — OFFICE VISIT (OUTPATIENT)
Dept: INTERNAL MEDICINE | Facility: CLINIC | Age: 44
End: 2024-03-04
Payer: MEDICAID

## 2024-03-04 VITALS
HEIGHT: 63 IN | SYSTOLIC BLOOD PRESSURE: 123 MMHG | HEART RATE: 93 BPM | WEIGHT: 161.81 LBS | BODY MASS INDEX: 28.67 KG/M2 | DIASTOLIC BLOOD PRESSURE: 82 MMHG

## 2024-03-04 DIAGNOSIS — Z11.59 ENCOUNTER FOR HCV SCREENING TEST FOR LOW RISK PATIENT: ICD-10-CM

## 2024-03-04 DIAGNOSIS — E66.9 OBESITY, CLASS I, BMI 30-34.9: ICD-10-CM

## 2024-03-04 DIAGNOSIS — Z87.448 HISTORY OF PYELONEPHRITIS: ICD-10-CM

## 2024-03-04 DIAGNOSIS — Z87.440 HISTORY OF UTI: ICD-10-CM

## 2024-03-04 DIAGNOSIS — R10.9 BILATERAL FLANK PAIN: ICD-10-CM

## 2024-03-04 DIAGNOSIS — I10 ESSENTIAL HYPERTENSION: Primary | ICD-10-CM

## 2024-03-04 PROBLEM — E66.811 OBESITY, CLASS I, BMI 30-34.9: Status: ACTIVE | Noted: 2024-03-04

## 2024-03-04 LAB
BILIRUB UR QL STRIP: NEGATIVE
CLARITY UR REFRACT.AUTO: CLEAR
COLOR UR AUTO: COLORLESS
GLUCOSE UR QL STRIP: NEGATIVE
HGB UR QL STRIP: NEGATIVE
KETONES UR QL STRIP: NEGATIVE
LEUKOCYTE ESTERASE UR QL STRIP: NEGATIVE
NITRITE UR QL STRIP: NEGATIVE
PH UR STRIP: 7 [PH] (ref 5–8)
PROT UR QL STRIP: NEGATIVE
SP GR UR STRIP: 1.01 (ref 1–1.03)
URN SPEC COLLECT METH UR: ABNORMAL

## 2024-03-04 PROCEDURE — 4010F ACE/ARB THERAPY RXD/TAKEN: CPT | Mod: CPTII,,,

## 2024-03-04 PROCEDURE — 99203 OFFICE O/P NEW LOW 30 MIN: CPT | Mod: S$PBB,,,

## 2024-03-04 PROCEDURE — 99214 OFFICE O/P EST MOD 30 MIN: CPT | Mod: PBBFAC

## 2024-03-04 PROCEDURE — 81003 URINALYSIS AUTO W/O SCOPE: CPT

## 2024-03-04 PROCEDURE — 3079F DIAST BP 80-89 MM HG: CPT | Mod: CPTII,,,

## 2024-03-04 PROCEDURE — 3074F SYST BP LT 130 MM HG: CPT | Mod: CPTII,,,

## 2024-03-04 PROCEDURE — 3008F BODY MASS INDEX DOCD: CPT | Mod: CPTII,,,

## 2024-03-04 PROCEDURE — 99999 PR PBB SHADOW E&M-EST. PATIENT-LVL IV: CPT | Mod: PBBFAC,,,

## 2024-03-04 RX ORDER — CHLORTHALIDONE 25 MG/1
25 TABLET ORAL DAILY
Qty: 30 TABLET | Refills: 3 | Status: SHIPPED | OUTPATIENT
Start: 2024-03-04

## 2024-03-04 RX ORDER — LOSARTAN POTASSIUM 25 MG/1
25 TABLET ORAL DAILY
Qty: 30 TABLET | Refills: 3 | Status: SHIPPED | OUTPATIENT
Start: 2024-03-04

## 2024-03-04 RX ORDER — NIFEDIPINE 30 MG/1
30 TABLET, EXTENDED RELEASE ORAL 2 TIMES DAILY
Qty: 60 TABLET | Refills: 3 | Status: SHIPPED | OUTPATIENT
Start: 2024-03-04

## 2024-03-04 NOTE — PATIENT INSTRUCTIONS
Ms. Mahendra Velasco     Thank you for coming to the clinic today. Lab work for this visit includes: CBC, CMP, Hepatitis Panel, and UA. [facility] 1 month. Please continue taking your home medications as indicated.     An outpatient specialty referral will be made on a future date:  [facility] ([phone])  CT abdomen pelvis with contrast    Lastly, please notify a health care provider or present to the ED if you experience any of the following: temperature >100.4, persistent nausea/vomiting or diarrhea, severe chest pain/tightness, difficulty breathing or unrelenting cough, severe persistent headache, worsening rash, persistent dizziness/light-headedness or visual disturbances, increased confusion or weakness/debility.    Please message me if you have any outstanding questions and thanks for choosing [facility] as your health care provider.    Sincerely,    Gustavo Garza MD  Internal Medicine PGY-3  [facility]

## 2024-03-04 NOTE — PROGRESS NOTES
Clinic Note          Subjective     Chief Complaint:   Chief Complaint   Patient presents with    Hospital Follow Up      History of Present Illness:  Ms. Mahendra Velasco is a 43 y.o. female with HTN who presents to the clinic for hospital follow up for bilateral pyelonephritis and HTN.     Per Hospital Course:   Patient is a 43-year-old woman admitted with sepsis secondary to pyelonephritis with E. Coli bacteremia. Patient treated with intravenous piperacillin/tazobactam. Patient with persistent fever and elevated white blood cell count. Infectious Disease service consulted and recommended patient continue with intravenous piperacillin tazobactam and to obtain repeat imaging to rule out possible perinephric abscess. CT negative perinephric process or other intra-abdominal fluid collection. Improved without further fever improvement of flank pain. Repeat blood cultures negative. Leukocytosis improving. E. coli species pansensitive. Patient is stable to discharge home on 2/26/24 to complete a course of oral antibiotics with ciprofloxacin.     Hospital course also notable for development of elevated blood pressure once her sepsis resolved. Prior medical chart notes suggests history of hypertension however she has not been taking blood pressure medications for some time. Patient restarted on blood pressure medication was adjusted to achieve reasonable blood pressure control.     Ms Velasco completed the course of antibiotics. Reported bilateral, radiating lower back pain > bilateral lower abdominal pain. Previously 10/10 pain while in the hospital, now 7/10. Nausea present with decreased appetite but no vomiting. Pressure like pain noted on urination but no urethral pain/discomfort. Negative for dyspareunia. No reported fevers, chills, change in bowel habits (normal bowel movement on 3/04/24).     CT abdo/pelvis w/ contrast from 2/23/24: Heterogeneous attenuation of the bilateral kidneys concerning for multifocal  pyelonephritis. No definite renal abscess seen. Follow-up recommended to ensure better perfusion. Note that renal infarction could have similar appearance but is consider less likely.     HTN SCREEN: /82mmHg. Current medications include losartran (Cozaar), Nifedipine, and Chlorthalidone. No current symptoms. Cardiovascular risk factors: hypertension and obesity (BMI >= 30 kg/m2).    HLD SCREEN: Last Lipids on 3/07/23 signific ant for elevated TC (224), LDL (141) and TG (158). No current medications.    DM SCREEN: Last HbA1C on 3/07/23 wnl at 5.5. No current medications or symptoms.     Review of Systems   Constitutional:  Negative for chills and fever.   HENT:  Negative for congestion, hearing loss, sinus pain and sore throat.    Eyes:  Negative for blurred vision, photophobia and redness.   Respiratory:  Negative for cough, shortness of breath and wheezing.    Cardiovascular:  Negative for chest pain, palpitations and leg swelling.   Gastrointestinal:  Positive for abdominal pain and nausea. Negative for blood in stool, constipation, diarrhea, melena and vomiting.   Genitourinary:  Positive for flank pain. Negative for dysuria, frequency and hematuria.   Musculoskeletal:  Positive for back pain. Negative for falls, joint pain and myalgias.   Skin:  Negative for itching and rash.   Neurological:  Negative for dizziness, tingling, weakness and headaches.   Psychiatric/Behavioral:  Negative for depression. The patient is not nervous/anxious.         PAST HISTORY:     Past Medical History:   Diagnosis Date    Essential (primary) hypertension 2024       Past Surgical History:   Procedure Laterality Date    bilateral tubal ligation       SECTION, CLASSIC      TUBAL LIGATION         MEDICATIONS & ALLERGIES:     Current Outpatient Medications on File Prior to Visit   Medication Sig    acetaminophen (TYLENOL) 500 MG tablet Take 2 tablets (1,000 mg total) by mouth every 8 (eight) hours as needed for Pain.  "   ciprofloxacin HCl (CIPRO) 500 MG tablet Take 1 tablet (500 mg total) by mouth every 12 (twelve) hours. for 7 days    oxyCODONE (ROXICODONE) 5 MG immediate release tablet Take 1 tablet (5 mg total) by mouth every 8 (eight) hours as needed (Pain not controlled by acetamiophen).    [DISCONTINUED] chlorthalidone (HYGROTEN) 25 MG Tab Take 1 tablet (25 mg total) by mouth once daily.    [DISCONTINUED] losartan (COZAAR) 25 MG tablet Take 1 tablet (25 mg total) by mouth once daily.    [DISCONTINUED] NIFEdipine (PROCARDIA-XL) 30 MG (OSM) 24 hr tablet Take 1 tablet (30 mg total) by mouth 2 (two) times a day.     No current facility-administered medications on file prior to visit.       Review of patient's allergies indicates:  No Known Allergies    OBJECTIVE:     Vitals:    03/04/24 0952   BP: 123/82   BP Location: Right arm   Patient Position: Sitting   BP Method: Medium (Automatic)   Pulse: 93   Weight: 73.4 kg (161 lb 13.1 oz)   Height: 5' 3" (1.6 m)       Body mass index is 28.66 kg/m².     Physical Exam  Vitals and nursing note reviewed.   Constitutional:       General: She is not in acute distress.     Appearance: Normal appearance. She is not ill-appearing.   HENT:      Head: Normocephalic and atraumatic.      Right Ear: External ear normal.      Left Ear: External ear normal.      Nose: Nose normal. No congestion.      Mouth/Throat:      Mouth: Mucous membranes are moist.      Pharynx: Oropharynx is clear.   Eyes:      General: No scleral icterus.        Right eye: No discharge.         Left eye: No discharge.      Extraocular Movements: Extraocular movements intact.      Conjunctiva/sclera: Conjunctivae normal.   Cardiovascular:      Rate and Rhythm: Normal rate and regular rhythm.      Pulses: Normal pulses.      Heart sounds: Normal heart sounds.   Pulmonary:      Effort: Pulmonary effort is normal. No respiratory distress.      Breath sounds: Normal breath sounds. No wheezing or rales.   Abdominal:      General: " Bowel sounds are normal. There is no distension.      Palpations: Abdomen is soft.      Tenderness: There is abdominal tenderness in the right lower quadrant, suprapubic area and left lower quadrant. There is no guarding.   Musculoskeletal:         General: No swelling or tenderness. Normal range of motion.      Cervical back: Neck supple. No rigidity or tenderness.      Comments: Bilateral flank pain present on palpation.   Skin:     General: Skin is warm.      Capillary Refill: Capillary refill takes less than 2 seconds.      Coloration: Skin is not jaundiced or pale.      Findings: No bruising.   Neurological:      General: No focal deficit present.      Mental Status: She is alert and oriented to person, place, and time.      Cranial Nerves: No cranial nerve deficit.   Psychiatric:         Mood and Affect: Mood normal.         Behavior: Behavior normal.        ASSESSMENT & PLAN:   Ms. Mahendra Velasco is a 43 y.o. female who was seen today in clinic for hospital follow up after being admitted and treated for pyelonephritis. Pt still reports bilateral flank pain that impacts ADLs despite finishing correct course of antibiotics. CT showed heterogeneous attenuation of the bilateral kidneys with no definite abscess. Due to persistent symptoms and concerns for ongoing/worsening renal infection/abscess, recommend f/u CT scan and subsequent labs for UA, CBC, and CMP. Low threshold to return to ED for worsening symptoms or fever. External Urology referral ordered for follow up. RTC in 1 month for PCP follow up.    1. Essential hypertension  Assessment & Plan:  HTN managed with nifedipine, losartan, and chlorthalidone. Baseline SBP in controlled in 120mmHg.   --Continue anti-hypertensive medications  --Cardiac diet, sodium <2g daily  --Weekly aerobic exercise goal of 150min  --Daily BP check/log    Orders:  -     Comprehensive Metabolic Panel; Future; Expected date: 03/04/2024  -     CBC Auto Differential; Future; Expected  date: 03/04/2024  -     NIFEdipine (PROCARDIA-XL) 30 MG (OSM) 24 hr tablet; Take 1 tablet (30 mg total) by mouth 2 (two) times a day.  Dispense: 60 tablet; Refill: 3  -     losartan (COZAAR) 25 MG tablet; Take 1 tablet (25 mg total) by mouth once daily.  Dispense: 30 tablet; Refill: 3  -     chlorthalidone (HYGROTEN) 25 MG Tab; Take 1 tablet (25 mg total) by mouth once daily.  Dispense: 30 tablet; Refill: 3    2. Bilateral flank pain  -     CT Abdomen Pelvis With IV Contrast NO Oral Contrast; Future; Expected date: 03/04/2024  -     Urinalysis, Reflex to Urine Culture Urine, Clean Catch    3. Encounter for HCV screening test for low risk patient    4. Obesity, Class I, BMI 30-34.9    5. History of pyelonephritis  -     Comprehensive Metabolic Panel; Future; Expected date: 03/04/2024  -     CBC Auto Differential; Future; Expected date: 03/04/2024  -     HEPATITIS PANEL, ACUTE; Future; Expected date: 03/04/2024  -     CT Abdomen Pelvis With IV Contrast NO Oral Contrast; Future; Expected date: 03/04/2024  -     Urinalysis, Reflex to Urine Culture Urine, Clean Catch    6. History of UTI  -     Urinalysis, Reflex to Urine Culture Urine, Clean Catch  -     Ambulatory referral/consult to Urology; Future; Expected date: 03/11/2024         Follow up in about 1 month (around 4/4/2024).   All questions answered. Pt to call/message the Primary [facility] for any additional concerns    Discussed with Dr Bowen - staff attestation to follow        Gustavo Garza MD  Internal Medicine, PGY-3  [facility]  [facility] [place] [address] [phone]

## 2024-03-04 NOTE — ASSESSMENT & PLAN NOTE
HTN managed with nifedipine, losartan, and chlorthalidone. Baseline SBP in controlled in 120mmHg.   --Continue anti-hypertensive medications  --Cardiac diet, sodium <2g daily  --Weekly aerobic exercise goal of 150min  --Daily BP check/log

## 2024-03-07 NOTE — PROGRESS NOTES
I have reviewed the notes, assessments, and/or procedures performed by the resident physician, I concur with her/his documentation of Mahendra Velasco.  Date of Service: 3/4/2024    Presented to clinic as Hosp follow up but has recurrence of pain. UA negative on repeat evaluation. Labs pending. HTN better controlled and will continue home log. Repeat CT but given return precautions to ED based on presenting symptoms. Urology ref placed. 1 month follow up.

## 2024-03-08 ENCOUNTER — HOSPITAL ENCOUNTER (OUTPATIENT)
Dept: RADIOLOGY | Facility: HOSPITAL | Age: 44
Discharge: HOME | End: 2024-03-08
Payer: MEDICAID

## 2024-03-08 DIAGNOSIS — Z87.448 HISTORY OF PYELONEPHRITIS: ICD-10-CM

## 2024-03-08 DIAGNOSIS — R10.9 BILATERAL FLANK PAIN: ICD-10-CM

## 2024-03-08 PROCEDURE — 25500020 PHARM REV CODE 255

## 2024-03-08 PROCEDURE — 74177 CT ABD & PELVIS W/CONTRAST: CPT | Mod: TC

## 2024-03-08 PROCEDURE — 74177 CT ABD & PELVIS W/CONTRAST: CPT | Mod: 26,GC,, | Performed by: RADIOLOGY

## 2024-03-08 RX ADMIN — IOHEXOL 75 ML: 350 INJECTION, SOLUTION INTRAVENOUS at 02:03

## 2024-05-27 PROBLEM — A41.9 SEPSIS DUE TO URINARY TRACT INFECTION: Status: RESOLVED | Noted: 2024-02-21 | Resolved: 2024-05-27

## 2024-05-27 PROBLEM — N39.0 SEPSIS DUE TO URINARY TRACT INFECTION: Status: RESOLVED | Noted: 2024-02-21 | Resolved: 2024-05-27

## 2025-01-17 ENCOUNTER — HOSPITAL ENCOUNTER (INPATIENT)
Facility: OTHER | Age: 45
LOS: 1 days | Discharge: LEFT BEFORE DISCHARGE | DRG: 690 | End: 2025-01-18
Attending: STUDENT IN AN ORGANIZED HEALTH CARE EDUCATION/TRAINING PROGRAM | Admitting: INTERNAL MEDICINE
Payer: MEDICAID

## 2025-01-17 DIAGNOSIS — N10 PYELONEPHRITIS, ACUTE: ICD-10-CM

## 2025-01-17 DIAGNOSIS — R10.9 FLANK PAIN: Primary | ICD-10-CM

## 2025-01-17 LAB
ALBUMIN SERPL BCP-MCNC: 4.6 G/DL (ref 3.5–5.2)
ALP SERPL-CCNC: 73 U/L (ref 40–150)
ALT SERPL W/O P-5'-P-CCNC: 16 U/L (ref 10–44)
ANION GAP SERPL CALC-SCNC: 13 MMOL/L (ref 8–16)
AST SERPL-CCNC: 14 U/L (ref 10–40)
B-HCG UR QL: NEGATIVE
BACTERIA #/AREA URNS HPF: ABNORMAL /HPF
BASOPHILS # BLD AUTO: 0.04 K/UL (ref 0–0.2)
BASOPHILS NFR BLD: 0.5 % (ref 0–1.9)
BILIRUB SERPL-MCNC: 0.3 MG/DL (ref 0.1–1)
BILIRUB UR QL STRIP: NEGATIVE
BUN SERPL-MCNC: 11 MG/DL (ref 6–20)
CALCIUM SERPL-MCNC: 10.6 MG/DL (ref 8.7–10.5)
CHLORIDE SERPL-SCNC: 103 MMOL/L (ref 95–110)
CLARITY UR: CLEAR
CO2 SERPL-SCNC: 24 MMOL/L (ref 23–29)
COLOR UR: YELLOW
CREAT SERPL-MCNC: 1.1 MG/DL (ref 0.5–1.4)
CTP QC/QA: YES
DIFFERENTIAL METHOD BLD: ABNORMAL
EOSINOPHIL # BLD AUTO: 0.1 K/UL (ref 0–0.5)
EOSINOPHIL NFR BLD: 1.1 % (ref 0–8)
ERYTHROCYTE [DISTWIDTH] IN BLOOD BY AUTOMATED COUNT: 14.2 % (ref 11.5–14.5)
EST. GFR  (NO RACE VARIABLE): >60 ML/MIN/1.73 M^2
GLUCOSE SERPL-MCNC: 118 MG/DL (ref 70–110)
GLUCOSE UR QL STRIP: NEGATIVE
HCT VFR BLD AUTO: 43 % (ref 37–48.5)
HGB BLD-MCNC: 14.4 G/DL (ref 12–16)
HGB UR QL STRIP: ABNORMAL
HIV 1+2 AB+HIV1 P24 AG SERPL QL IA: NEGATIVE
IMM GRANULOCYTES # BLD AUTO: 0.01 K/UL (ref 0–0.04)
IMM GRANULOCYTES NFR BLD AUTO: 0.1 % (ref 0–0.5)
KETONES UR QL STRIP: NEGATIVE
LEUKOCYTE ESTERASE UR QL STRIP: ABNORMAL
LYMPHOCYTES # BLD AUTO: 3.5 K/UL (ref 1–4.8)
LYMPHOCYTES NFR BLD: 42.2 % (ref 18–48)
MCH RBC QN AUTO: 29 PG (ref 27–31)
MCHC RBC AUTO-ENTMCNC: 33.5 G/DL (ref 32–36)
MCV RBC AUTO: 87 FL (ref 82–98)
MICROSCOPIC COMMENT: ABNORMAL
MONOCYTES # BLD AUTO: 0.4 K/UL (ref 0.3–1)
MONOCYTES NFR BLD: 4.6 % (ref 4–15)
NEUTROPHILS # BLD AUTO: 4.3 K/UL (ref 1.8–7.7)
NEUTROPHILS NFR BLD: 51.5 % (ref 38–73)
NITRITE UR QL STRIP: NEGATIVE
NRBC BLD-RTO: 0 /100 WBC
PH UR STRIP: 6 [PH] (ref 5–8)
PLATELET # BLD AUTO: 427 K/UL (ref 150–450)
PMV BLD AUTO: 8.5 FL (ref 9.2–12.9)
POTASSIUM SERPL-SCNC: 4 MMOL/L (ref 3.5–5.1)
PROT SERPL-MCNC: 8.5 G/DL (ref 6–8.4)
PROT UR QL STRIP: NEGATIVE
RBC # BLD AUTO: 4.96 M/UL (ref 4–5.4)
RBC #/AREA URNS HPF: 5 /HPF (ref 0–4)
SODIUM SERPL-SCNC: 140 MMOL/L (ref 136–145)
SP GR UR STRIP: 1.02 (ref 1–1.03)
SQUAMOUS #/AREA URNS HPF: 1 /HPF
URN SPEC COLLECT METH UR: ABNORMAL
UROBILINOGEN UR STRIP-ACNC: NEGATIVE EU/DL
WBC # BLD AUTO: 8.29 K/UL (ref 3.9–12.7)
WBC #/AREA URNS HPF: 11 /HPF (ref 0–5)

## 2025-01-17 PROCEDURE — G0378 HOSPITAL OBSERVATION PER HR: HCPCS

## 2025-01-17 PROCEDURE — 85025 COMPLETE CBC W/AUTO DIFF WBC: CPT | Performed by: NURSE PRACTITIONER

## 2025-01-17 PROCEDURE — 63600175 PHARM REV CODE 636 W HCPCS: Mod: TB | Performed by: NURSE PRACTITIONER

## 2025-01-17 PROCEDURE — 81000 URINALYSIS NONAUTO W/SCOPE: CPT | Performed by: NURSE PRACTITIONER

## 2025-01-17 PROCEDURE — 87086 URINE CULTURE/COLONY COUNT: CPT | Performed by: NURSE PRACTITIONER

## 2025-01-17 PROCEDURE — 25000003 PHARM REV CODE 250

## 2025-01-17 PROCEDURE — 99285 EMERGENCY DEPT VISIT HI MDM: CPT | Mod: 25

## 2025-01-17 PROCEDURE — 81025 URINE PREGNANCY TEST: CPT | Performed by: NURSE PRACTITIONER

## 2025-01-17 PROCEDURE — 96374 THER/PROPH/DIAG INJ IV PUSH: CPT

## 2025-01-17 PROCEDURE — 80053 COMPREHEN METABOLIC PANEL: CPT | Performed by: NURSE PRACTITIONER

## 2025-01-17 PROCEDURE — 96375 TX/PRO/DX INJ NEW DRUG ADDON: CPT

## 2025-01-17 PROCEDURE — 63600175 PHARM REV CODE 636 W HCPCS

## 2025-01-17 PROCEDURE — 87389 HIV-1 AG W/HIV-1&-2 AB AG IA: CPT | Performed by: EMERGENCY MEDICINE

## 2025-01-17 PROCEDURE — 11000001 HC ACUTE MED/SURG PRIVATE ROOM

## 2025-01-17 PROCEDURE — 63600175 PHARM REV CODE 636 W HCPCS: Performed by: STUDENT IN AN ORGANIZED HEALTH CARE EDUCATION/TRAINING PROGRAM

## 2025-01-17 RX ORDER — MORPHINE SULFATE 4 MG/ML
4 INJECTION, SOLUTION INTRAMUSCULAR; INTRAVENOUS
Status: DISCONTINUED | OUTPATIENT
Start: 2025-01-17 | End: 2025-01-17

## 2025-01-17 RX ORDER — ONDANSETRON HYDROCHLORIDE 2 MG/ML
4 INJECTION, SOLUTION INTRAVENOUS
Status: COMPLETED | OUTPATIENT
Start: 2025-01-17 | End: 2025-01-17

## 2025-01-17 RX ORDER — LOSARTAN POTASSIUM 25 MG/1
25 TABLET ORAL DAILY
Status: DISCONTINUED | OUTPATIENT
Start: 2025-01-18 | End: 2025-01-18 | Stop reason: HOSPADM

## 2025-01-17 RX ORDER — OXYCODONE AND ACETAMINOPHEN 5; 325 MG/1; MG/1
1 TABLET ORAL
Status: COMPLETED | OUTPATIENT
Start: 2025-01-17 | End: 2025-01-17

## 2025-01-17 RX ORDER — NIFEDIPINE 30 MG/1
30 TABLET, EXTENDED RELEASE ORAL 2 TIMES DAILY
Status: DISCONTINUED | OUTPATIENT
Start: 2025-01-17 | End: 2025-01-18 | Stop reason: HOSPADM

## 2025-01-17 RX ORDER — CEFTRIAXONE 2 G/1
2 INJECTION, POWDER, FOR SOLUTION INTRAMUSCULAR; INTRAVENOUS
Status: COMPLETED | OUTPATIENT
Start: 2025-01-17 | End: 2025-01-17

## 2025-01-17 RX ORDER — MORPHINE SULFATE 2 MG/ML
4 INJECTION, SOLUTION INTRAMUSCULAR; INTRAVENOUS
Status: COMPLETED | OUTPATIENT
Start: 2025-01-17 | End: 2025-01-17

## 2025-01-17 RX ORDER — KETOROLAC TROMETHAMINE 30 MG/ML
10 INJECTION, SOLUTION INTRAMUSCULAR; INTRAVENOUS
Status: COMPLETED | OUTPATIENT
Start: 2025-01-17 | End: 2025-01-17

## 2025-01-17 RX ADMIN — ONDANSETRON 4 MG: 2 INJECTION INTRAMUSCULAR; INTRAVENOUS at 03:01

## 2025-01-17 RX ADMIN — MORPHINE SULFATE 4 MG: 2 INJECTION, SOLUTION INTRAMUSCULAR; INTRAVENOUS at 08:01

## 2025-01-17 RX ADMIN — MORPHINE SULFATE 4 MG: 2 INJECTION, SOLUTION INTRAMUSCULAR; INTRAVENOUS at 04:01

## 2025-01-17 RX ADMIN — OXYCODONE HYDROCHLORIDE AND ACETAMINOPHEN 1 TABLET: 5; 325 TABLET ORAL at 06:01

## 2025-01-17 RX ADMIN — CEFTRIAXONE SODIUM 2 G: 2 INJECTION, POWDER, FOR SOLUTION INTRAMUSCULAR; INTRAVENOUS at 08:01

## 2025-01-17 RX ADMIN — KETOROLAC TROMETHAMINE 10 MG: 30 INJECTION, SOLUTION INTRAMUSCULAR at 03:01

## 2025-01-17 NOTE — ED PROVIDER NOTES
Encounter Date: 2025       History     Chief Complaint   Patient presents with    Flank Pain     R flank pain and nausea since Tuesday along with frequent urination.     45yo female with no pertinent pmhx presents complaining of 10/10 R flank pain X 4 days. Pt states she was at work () when the onset of symptoms began suddenly. Pt denies trauma or injury. Pt states symptoms feel similarly to the last time she had a kidney infection. Pt states in March of this year pt was admitted for a kidney disorder. Pt denies urinary symptoms such as increased urination, pain/burning with urination, blood in urine. Pt reports associated nausea but no vomiting. Pt states last meal was yesterday but that she has had decreased appetite. Pt denies CP, SOB, diarrhea, abdominal pain, hematuria, hematemesis, melena, fever, chills        The history is provided by the patient.     Review of patient's allergies indicates:  No Known Allergies  Past Medical History:   Diagnosis Date    Essential (primary) hypertension 2024     Past Surgical History:   Procedure Laterality Date    bilateral tubal ligation       SECTION, CLASSIC      TUBAL LIGATION       Family History   Problem Relation Name Age of Onset    Hypertension Mother      Diabetes Mother      Hypertension Maternal Grandmother      Heart disease Maternal Grandmother      Diabetes Maternal Grandmother       Social History     Tobacco Use    Smoking status: Former     Current packs/day: 0.15     Average packs/day: 0.2 packs/day for 4.9 years (0.7 ttl pk-yrs)     Types: Cigarettes     Start date: 3/1/2020    Smokeless tobacco: Never   Substance Use Topics    Alcohol use: Not Currently     Comment: Previously a glass of wine/week    Drug use: Yes     Frequency: 5.0 times per week     Types: Marijuana     Review of Systems  As per hpi  Physical Exam     Initial Vitals [25 1324]   BP Pulse Resp Temp SpO2   (!) 196/128 (!) 120 20 98 °F (36.7 °C) 97 %       MAP       --         Physical Exam    Nursing note and vitals reviewed.  Constitutional: She appears well-developed and well-nourished.  Non-toxic appearance. She appears distressed.   HENT:   Head: Normocephalic and atraumatic.   Eyes: Conjunctivae are normal.   Neck:   Normal range of motion.  Cardiovascular:  Normal rate, regular rhythm and normal heart sounds.           Pulmonary/Chest: No respiratory distress. She has no wheezes. She has no rhonchi.   Abdominal: Bowel sounds are normal. There is no abdominal tenderness.   There is right CVA tenderness.  No left CVA tenderness. There is negative Carpenter's sign. negative Rovsing's sign  Musculoskeletal:         General: No tenderness. Normal range of motion.      Cervical back: Normal range of motion.     Neurological: She is alert and oriented to person, place, and time. She has normal strength. GCS score is 15. GCS eye subscore is 4. GCS verbal subscore is 5. GCS motor subscore is 6.   Skin: Skin is warm, dry and intact. No rash noted.   Dry, scaly macular Rash on R upper abdomen about 2in in diameter.   Psychiatric: She has a normal mood and affect.         ED Course   Procedures  Labs Reviewed   URINALYSIS, REFLEX TO URINE CULTURE - Abnormal       Result Value    Specimen UA Urine, Clean Catch      Color, UA Yellow      Appearance, UA Clear      pH, UA 6.0      Specific Gravity, UA 1.020      Protein, UA Negative      Glucose, UA Negative      Ketones, UA Negative      Bilirubin (UA) Negative      Occult Blood UA 1+ (*)     Nitrite, UA Negative      Urobilinogen, UA Negative      Leukocytes, UA Trace (*)     Narrative:     Specimen Source->Urine   CBC W/ AUTO DIFFERENTIAL - Abnormal    WBC 8.29      RBC 4.96      Hemoglobin 14.4      Hematocrit 43.0      MCV 87      MCH 29.0      MCHC 33.5      RDW 14.2      Platelets 427      MPV 8.5 (*)     Immature Granulocytes 0.1      Gran # (ANC) 4.3      Immature Grans (Abs) 0.01      Lymph # 3.5      Mono # 0.4       Eos # 0.1      Baso # 0.04      nRBC 0      Gran % 51.5      Lymph % 42.2      Mono % 4.6      Eosinophil % 1.1      Basophil % 0.5      Differential Method Automated      Narrative:     Release to patient->Immediate   COMPREHENSIVE METABOLIC PANEL - Abnormal    Sodium 140      Potassium 4.0      Chloride 103      CO2 24      Glucose 118 (*)     BUN 11      Creatinine 1.1      Calcium 10.6 (*)     Total Protein 8.5 (*)     Albumin 4.6      Total Bilirubin 0.3      Alkaline Phosphatase 73      AST 14      ALT 16      eGFR >60      Anion Gap 13      Narrative:     Release to patient->Immediate   URINALYSIS MICROSCOPIC - Abnormal    RBC, UA 5 (*)     WBC, UA 11 (*)     Bacteria Few (*)     Squam Epithel, UA 1      Microscopic Comment SEE COMMENT      Narrative:     Specimen Source->Urine   CULTURE, URINE   HIV 1 / 2 ANTIBODY    HIV 1/2 Ag/Ab Negative      Narrative:     Release to patient->Immediate   POCT URINE PREGNANCY    POC Preg Test, Ur Negative       Acceptable Yes            Imaging Results              US Pelvis Comp with Transvag NON-OB (xpd) (Final result)  Result time 01/17/25 18:46:09   Procedure changed from US Transvaginal Non OB     Final result by Gonzalo Gallegos MD (01/17/25 18:46:09)                   Impression:      Leiomyomatous uterus.    No findings to suggest vascular compromise to the right ovary.  The left ovary is not identified.      Electronically signed by: Gonzalo Gallegos MD  Date:    01/17/2025  Time:    18:46               Narrative:    EXAMINATION:  US PELVIS COMP WITH TRANSVAG NON-OB (XPD)    CLINICAL HISTORY:  ovarian torsion; Torsion of ovary and ovarian pedicle, unspecified side    TECHNIQUE:  Transabdominal sonography of the pelvis was performed, followed by transvaginal sonography to better evaluate the uterus and ovaries.    COMPARISON:  11/08/2012, CT renal stone study 01/17/2025    FINDINGS:  The uterus measures approximately 9.0 x 3.5 x 6.0 cm.  Uterine  leiomyoma measure approximately 3.6 x 3.5 x 3.6 cm and 2.0 x 2.0 x 2.3 cm.  The endometrial stripe is obscured by artifact from leiomyoma.  Cervix is unremarkable.    The right ovary measures approximately 2.3 x 1.3 x 2.2 cm.  The left ovary is not identified.  No significant free fluid in the pelvis.  Arterial and venous flow is documented to the right ovary.                                        CT Renal Stone Study ABD Pelvis WO (Final result)  Result time 01/17/25 16:09:31      Final result by Leonard Anderson MD (01/17/25 16:09:31)                   Impression:      1. No acute process or CT findings to explain patient's symptoms of flank pain on this noncontrast study.  Specifically, no evidence of radiopaque calculus within the urinary tract or obstructive uropathy.  2. Hepatomegaly.  3. Systemic calcific atherosclerosis, somewhat age advanced.  4. Suspected uterine fibroids.  5. Medial right breast partially imaged 1.9 cm mass, incompletely characterized on this noncontrast study.  Suggest dedicated mammographic evaluation, as warranted.  This report was flagged in Epic as containing an incidental finding.      Electronically signed by: Leonard Anderson MD  Date:    01/17/2025  Time:    16:09               Narrative:    EXAMINATION:  CT RENAL STONE STUDY ABD PELVIS WO    CLINICAL HISTORY:  Flank pain, kidney stone suspected;    TECHNIQUE:  Low dose axial images, sagittal and coronal reformations were obtained from the lung bases to the pubic symphysis.  Contrast was not administered.    COMPARISON:  CT abdomen and pelvis most recent 03/08/2024, right upper quadrant ultrasound 02/21/2024, pelvic ultrasound 11/08/2012    FINDINGS:  Lack of IV contrast limits evaluation of soft tissue and vascular structures.    Imaged lung bases are clear.  Base of the heart is normal in size noting trace nonspecific pericardial fluid similar to prior.    Liver measures 18.7 cm in length without focal process.    Noncontrast  appearance of the gallbladder, pancreas, spleen, stomach, duodenum and bilateral adrenal glands are within normal limits.  No significant biliary ductal dilatation.    Bilateral kidneys are normal in size, shape and location.  No radiopaque calculus seen within the urinary tract.  No hydronephrosis or significant perinephric stranding.  Ureters are normal in course and caliber.  Bladder is suboptimally distended.  Uterus is anteflexed and tilted towards the right and appears prominent with lobulated contour of the upper body and fundus suggesting underlying fibroids.  No adnexal mass or significant volume free pelvic fluid.  Pelvic phleboliths noted.    Appendix and terminal ileum are within normal limits.  Grossly similar mild nonspecific mural fat deposition of the cecum and scattered areas throughout the colon without adjacent inflammatory change likely sequela of remote infectious or inflammatory process or prior laxative use.  No evidence of bowel obstruction or acute inflammation.  No bowel pneumatosis or portal venous gas.    No ascites, free air or lymphadenopathy by CT criteria.    Mild to moderate scattered calcific atherosclerosis of the abdominal aorta extending into its iliac branches, somewhat advanced for age.  Aorta tapers normally.    At the medial aspect of the mid to upper right breast there is an approximate 1.9 cm well-circumscribed homogeneous mass with average 39 Hounsfield units, incompletely characterized on this noncontrast study.  No adjacent inflammatory change.  Extraperitoneal soft tissues are within normal limits.    Osseous structures appear stable without acute findings.                                       Medications   ondansetron injection 4 mg (4 mg Intravenous Given 1/17/25 1524)   ketorolac injection 9.999 mg (9.999 mg Intravenous Given 1/17/25 1523)   morphine injection 4 mg (4 mg Intravenous Given 1/17/25 1649)   oxyCODONE-acetaminophen 5-325 mg per tablet 1 tablet (1 tablet  Oral Given 1/17/25 1831)   cefTRIAXone injection 2 g (2 g Intravenous Given 1/17/25 2024)   morphine injection 4 mg (4 mg Intravenous Given 1/17/25 2031)     Medical Decision Making  Urgent evaluation of a 43yo afebrile distressed female presenting for R sided flank/back pain that started 4 days ago. Pt with hx of hospital admission for sepsis secondary to pyelonephritis with E. Coli bacteremia 10 months ago. VSS. Physical exam remarkable for R CVA tenderness. Labs from triage provider resulted at initial eval, remarkable only for small UTI. No leukocytosis or electrolyte abnormalities. Will treat pain and await results of CT renal study ordered by triage provider.     Differential Diagnosis includes, but is not limited to:  Cauda equina syndrome, diskitis/osteomyelitis, epidural/paraspinal abscess, AAA, aortic dissection, spinal cord injury, disc herniation, spinal stenosis, sciatica, radiculopathy, neoplasm, lumbar muscle strain, muscle spasm, neuropathic pain, UTI/pyelonephritis, nephrolithiasis, ovarian torsion     CT renal:   1. No acute process or CT findings to explain patient's symptoms of flank pain on this noncontrast study.  Specifically, no evidence of radiopaque calculus within the urinary tract or obstructive uropathy.  2. Hepatomegaly.  3. Systemic calcific atherosclerosis, somewhat age advanced.  4. Suspected uterine fibroids.  5. Medial right breast partially imaged 1.9 cm mass, incompletely characterized on this noncontrast study.  Suggest dedicated mammographic evaluation, as warranted.    Will order transvaginal US to r/o pelvic abnormalities. High suspicion for pyelonephritis considering pt's history.     Pt still in significant pain despite toradol, IV morphine, percocet po. Will plan to admit for suspected pyelonephritis treatment and pain management. Hospital medicine agrees to admit.      Problems Addressed:  Flank pain: acute illness or injury    Amount and/or Complexity of Data  Reviewed  Radiology: ordered. Decision-making details documented in ED Course.    Risk  Prescription drug management.               ED Course as of 01/17/25 2114 Fri Jan 17, 2025   1524 Cbc, cmp relatively unremarkable [RM]   1601 Pt still in significant pain, will order IV morphine [RM]   1700 On re-eval pt doing much better, pain improved.  [RM]   1839 Pt still with significant pain. Will attempt po pain control challenge. [RM]   1853 US Pelvis Comp with Transvag NON-OB (xpd)  Leiomyomatous uterus.     No findings to suggest vascular compromise to the right ovary.  The left ovary is not identified.      [RM]      ED Course User Index  [RM] Jocelin Chávez PA-C                           Clinical Impression:  Final diagnoses:  [R10.9] Flank pain (Primary)          ED Disposition Condition    Observation Stable                Jocelin Chávez PA-C  01/17/25 2114

## 2025-01-17 NOTE — Clinical Note
Diagnosis: Flank pain [492926]   Future Attending Provider: MIGUEL ANGEL GOMES [[id number]]   Is the patient being sent to ED Observation?: No

## 2025-01-17 NOTE — FIRST PROVIDER EVALUATION
Emergency Department TeleTriage Encounter Note      CHIEF COMPLAINT    Chief Complaint   Patient presents with    Flank Pain     R flank pain and nausea since Tuesday along with frequent urination.       VITAL SIGNS   Initial Vitals [01/17/25 1324]   BP Pulse Resp Temp SpO2   (!) 196/128 (!) 120 20 98 °F (36.7 °C) 97 %      MAP       --            ALLERGIES    Review of patient's allergies indicates:  No Known Allergies    PROVIDER TRIAGE NOTE  Right flank pain and urinary frequency for four days. Denies fever, vomiting but has had nausea. Appears uncomfortable in triage with heart rate noted to be 120.    Limited physical exam via telehealth: The patient is awake, alert, answering questions appropriately and is not in respiratory distress.  As the Teletriage provider, I performed an initial assessment and ordered appropriate labs and imaging studies, if any, to facilitate the patient's care once placed in the ED. Once a room is available, care and a full evaluation will be completed by an alternate ED provider.  Any additional orders and the final disposition will be determined by that provider.  All imaging and labs will not be followed-up by the Teletriage Team, including myself.          ORDERS  Labs Reviewed   HIV 1 / 2 ANTIBODY       ED Orders (720h ago, onward)      Start Ordered     Status Ordering Provider    01/17/25 1345 01/17/25 1332  ondansetron injection 4 mg  ED 1 Time         Ordered ISABEL ELLIS    01/17/25 1345 01/17/25 1332  ketorolac injection 9.999 mg  ED 1 Time         Ordered ISABEL ELLIS    01/17/25 1332 01/17/25 1331  CT Renal Stone Study ABD Pelvis WO  1 time imaging         Ordered ISABEL ELLIS    01/17/25 1331 01/17/25 1330  POCT urine pregnancy  Once         Ordered ISABEL ELLIS    01/17/25 1331 01/17/25 1330  Urinalysis, Reflex to Urine Culture Urine, Clean Catch  STAT         Ordered ISABEL ELLIS    01/17/25 1331 01/17/25 1330  Saline lock IV  Once          Ordered ISABEL ELLIS.    01/17/25 1331 01/17/25 1330  CBC auto differential  STAT         Ordered URVASHIDAR ISABEL A.    01/17/25 1331 01/17/25 1330  Comprehensive Metabolic Panel  STAT         Ordered JUAN PABLOBREANNA ISABEL CARLOS.    01/17/25 1327 01/17/25 1326  HIV 1/2 Ag/Ab (4th Gen)  STAT         Ordered BK LOONEY              Virtual Visit Note: The provider triage portion of this emergency department evaluation and documentation was performed via [email or web address], a HIPAA-compliant telemedicine application, in concert with a tele-presenter in the room. A face to face patient evaluation with one of my colleagues will occur once the patient is placed in an emergency department room.      DISCLAIMER: This note was prepared with [employer] voice recognition transcription software. Garbled syntax, mangled pronouns, and other bizarre constructions may be attributed to that software system.     No

## 2025-01-18 VITALS
HEART RATE: 83 BPM | HEIGHT: 63 IN | WEIGHT: 164.44 LBS | TEMPERATURE: 99 F | OXYGEN SATURATION: 100 % | SYSTOLIC BLOOD PRESSURE: 164 MMHG | DIASTOLIC BLOOD PRESSURE: 89 MMHG | RESPIRATION RATE: 16 BRPM | BODY MASS INDEX: 29.14 KG/M2

## 2025-01-18 PROBLEM — N12 PYELONEPHRITIS: Status: ACTIVE | Noted: 2025-01-17

## 2025-01-18 LAB
ANION GAP SERPL CALC-SCNC: 12 MMOL/L (ref 8–16)
BASOPHILS # BLD AUTO: 0.05 K/UL (ref 0–0.2)
BASOPHILS NFR BLD: 0.5 % (ref 0–1.9)
BUN SERPL-MCNC: 11 MG/DL (ref 6–20)
CALCIUM SERPL-MCNC: 10.2 MG/DL (ref 8.7–10.5)
CHLORIDE SERPL-SCNC: 103 MMOL/L (ref 95–110)
CO2 SERPL-SCNC: 25 MMOL/L (ref 23–29)
CREAT SERPL-MCNC: 1 MG/DL (ref 0.5–1.4)
DIFFERENTIAL METHOD BLD: ABNORMAL
EOSINOPHIL # BLD AUTO: 0.1 K/UL (ref 0–0.5)
EOSINOPHIL NFR BLD: 1.2 % (ref 0–8)
ERYTHROCYTE [DISTWIDTH] IN BLOOD BY AUTOMATED COUNT: 14.5 % (ref 11.5–14.5)
EST. GFR  (NO RACE VARIABLE): >60 ML/MIN/1.73 M^2
GLUCOSE SERPL-MCNC: 115 MG/DL (ref 70–110)
HCT VFR BLD AUTO: 41.7 % (ref 37–48.5)
HGB BLD-MCNC: 13.6 G/DL (ref 12–16)
IMM GRANULOCYTES # BLD AUTO: 0.03 K/UL (ref 0–0.04)
IMM GRANULOCYTES NFR BLD AUTO: 0.3 % (ref 0–0.5)
LYMPHOCYTES # BLD AUTO: 3 K/UL (ref 1–4.8)
LYMPHOCYTES NFR BLD: 33.1 % (ref 18–48)
MAGNESIUM SERPL-MCNC: 1.8 MG/DL (ref 1.6–2.6)
MCH RBC QN AUTO: 28.8 PG (ref 27–31)
MCHC RBC AUTO-ENTMCNC: 32.6 G/DL (ref 32–36)
MCV RBC AUTO: 88 FL (ref 82–98)
MONOCYTES # BLD AUTO: 0.8 K/UL (ref 0.3–1)
MONOCYTES NFR BLD: 8.2 % (ref 4–15)
NEUTROPHILS # BLD AUTO: 5.2 K/UL (ref 1.8–7.7)
NEUTROPHILS NFR BLD: 56.7 % (ref 38–73)
NRBC BLD-RTO: 0 /100 WBC
PLATELET # BLD AUTO: 397 K/UL (ref 150–450)
PMV BLD AUTO: 9 FL (ref 9.2–12.9)
POTASSIUM SERPL-SCNC: 4.1 MMOL/L (ref 3.5–5.1)
RBC # BLD AUTO: 4.72 M/UL (ref 4–5.4)
SODIUM SERPL-SCNC: 140 MMOL/L (ref 136–145)
WBC # BLD AUTO: 9.12 K/UL (ref 3.9–12.7)

## 2025-01-18 PROCEDURE — 83735 ASSAY OF MAGNESIUM: CPT | Performed by: PHYSICIAN ASSISTANT

## 2025-01-18 PROCEDURE — 85025 COMPLETE CBC W/AUTO DIFF WBC: CPT | Performed by: PHYSICIAN ASSISTANT

## 2025-01-18 PROCEDURE — 36415 COLL VENOUS BLD VENIPUNCTURE: CPT | Performed by: PHYSICIAN ASSISTANT

## 2025-01-18 PROCEDURE — 25000003 PHARM REV CODE 250: Performed by: INTERNAL MEDICINE

## 2025-01-18 PROCEDURE — A4216 STERILE WATER/SALINE, 10 ML: HCPCS | Performed by: PHYSICIAN ASSISTANT

## 2025-01-18 PROCEDURE — 63600175 PHARM REV CODE 636 W HCPCS: Mod: TB | Performed by: PHYSICIAN ASSISTANT

## 2025-01-18 PROCEDURE — 25000003 PHARM REV CODE 250: Performed by: PHYSICIAN ASSISTANT

## 2025-01-18 PROCEDURE — 80048 BASIC METABOLIC PNL TOTAL CA: CPT | Performed by: PHYSICIAN ASSISTANT

## 2025-01-18 PROCEDURE — 21400001 HC TELEMETRY ROOM

## 2025-01-18 RX ORDER — HYDRALAZINE HYDROCHLORIDE 20 MG/ML
10 INJECTION INTRAMUSCULAR; INTRAVENOUS EVERY 6 HOURS PRN
Status: DISCONTINUED | OUTPATIENT
Start: 2025-01-18 | End: 2025-01-18 | Stop reason: HOSPADM

## 2025-01-18 RX ORDER — IBUPROFEN 200 MG
24 TABLET ORAL
Status: DISCONTINUED | OUTPATIENT
Start: 2025-01-18 | End: 2025-01-18 | Stop reason: HOSPADM

## 2025-01-18 RX ORDER — KETOROLAC TROMETHAMINE 30 MG/ML
15 INJECTION, SOLUTION INTRAMUSCULAR; INTRAVENOUS EVERY 6 HOURS PRN
Status: DISCONTINUED | OUTPATIENT
Start: 2025-01-18 | End: 2025-01-18 | Stop reason: HOSPADM

## 2025-01-18 RX ORDER — OXYCODONE AND ACETAMINOPHEN 5; 325 MG/1; MG/1
1 TABLET ORAL EVERY 4 HOURS PRN
Status: DISCONTINUED | OUTPATIENT
Start: 2025-01-18 | End: 2025-01-18

## 2025-01-18 RX ORDER — OXYCODONE AND ACETAMINOPHEN 7.5; 325 MG/1; MG/1
1 TABLET ORAL EVERY 6 HOURS PRN
Status: DISCONTINUED | OUTPATIENT
Start: 2025-01-18 | End: 2025-01-18 | Stop reason: HOSPADM

## 2025-01-18 RX ORDER — IBUPROFEN 200 MG
16 TABLET ORAL
Status: DISCONTINUED | OUTPATIENT
Start: 2025-01-18 | End: 2025-01-18 | Stop reason: HOSPADM

## 2025-01-18 RX ORDER — ACETAMINOPHEN 325 MG/1
650 TABLET ORAL EVERY 6 HOURS PRN
Status: DISCONTINUED | OUTPATIENT
Start: 2025-01-18 | End: 2025-01-18 | Stop reason: HOSPADM

## 2025-01-18 RX ORDER — CEFTRIAXONE 1 G/1
1 INJECTION, POWDER, FOR SOLUTION INTRAMUSCULAR; INTRAVENOUS
Status: DISCONTINUED | OUTPATIENT
Start: 2025-01-18 | End: 2025-01-18 | Stop reason: HOSPADM

## 2025-01-18 RX ORDER — LIDOCAINE 50 MG/G
1 PATCH TOPICAL
Status: DISCONTINUED | OUTPATIENT
Start: 2025-01-18 | End: 2025-01-18 | Stop reason: HOSPADM

## 2025-01-18 RX ORDER — GLUCAGON 1 MG
1 KIT INJECTION
Status: DISCONTINUED | OUTPATIENT
Start: 2025-01-18 | End: 2025-01-18 | Stop reason: HOSPADM

## 2025-01-18 RX ORDER — TRAMADOL HYDROCHLORIDE 50 MG/1
50 TABLET ORAL EVERY 6 HOURS PRN
Status: DISCONTINUED | OUTPATIENT
Start: 2025-01-18 | End: 2025-01-18

## 2025-01-18 RX ORDER — ONDANSETRON 4 MG/1
4 TABLET, ORALLY DISINTEGRATING ORAL EVERY 6 HOURS PRN
Status: DISCONTINUED | OUTPATIENT
Start: 2025-01-18 | End: 2025-01-18 | Stop reason: HOSPADM

## 2025-01-18 RX ORDER — NALOXONE HCL 0.4 MG/ML
0.02 VIAL (ML) INJECTION
Status: DISCONTINUED | OUTPATIENT
Start: 2025-01-18 | End: 2025-01-18 | Stop reason: HOSPADM

## 2025-01-18 RX ORDER — TALC
6 POWDER (GRAM) TOPICAL NIGHTLY PRN
Status: DISCONTINUED | OUTPATIENT
Start: 2025-01-18 | End: 2025-01-18 | Stop reason: HOSPADM

## 2025-01-18 RX ORDER — AMOXICILLIN 250 MG
1 CAPSULE ORAL 2 TIMES DAILY PRN
Status: DISCONTINUED | OUTPATIENT
Start: 2025-01-18 | End: 2025-01-18 | Stop reason: HOSPADM

## 2025-01-18 RX ORDER — SODIUM CHLORIDE 0.9 % (FLUSH) 0.9 %
10 SYRINGE (ML) INJECTION EVERY 8 HOURS
Status: DISCONTINUED | OUTPATIENT
Start: 2025-01-18 | End: 2025-01-18 | Stop reason: HOSPADM

## 2025-01-18 RX ORDER — ONDANSETRON HYDROCHLORIDE 2 MG/ML
4 INJECTION, SOLUTION INTRAVENOUS EVERY 6 HOURS PRN
Status: DISCONTINUED | OUTPATIENT
Start: 2025-01-18 | End: 2025-01-18 | Stop reason: HOSPADM

## 2025-01-18 RX ADMIN — ACETAMINOPHEN 650 MG: 325 TABLET, FILM COATED ORAL at 12:01

## 2025-01-18 RX ADMIN — ONDANSETRON 4 MG: 4 TABLET, ORALLY DISINTEGRATING ORAL at 12:01

## 2025-01-18 RX ADMIN — LIDOCAINE 1 PATCH: 50 PATCH CUTANEOUS at 12:01

## 2025-01-18 RX ADMIN — OXYCODONE HYDROCHLORIDE AND ACETAMINOPHEN 1 TABLET: 5; 325 TABLET ORAL at 03:01

## 2025-01-18 RX ADMIN — TRAMADOL HYDROCHLORIDE 50 MG: 50 TABLET, COATED ORAL at 12:01

## 2025-01-18 RX ADMIN — LOSARTAN POTASSIUM 25 MG: 25 TABLET, FILM COATED ORAL at 08:01

## 2025-01-18 RX ADMIN — Medication 10 ML: at 05:01

## 2025-01-18 RX ADMIN — Medication 10 ML: at 03:01

## 2025-01-18 RX ADMIN — NIFEDIPINE 30 MG: 30 TABLET, FILM COATED, EXTENDED RELEASE ORAL at 08:01

## 2025-01-18 RX ADMIN — NIFEDIPINE 30 MG: 30 TABLET, FILM COATED, EXTENDED RELEASE ORAL at 12:01

## 2025-01-18 RX ADMIN — HYDRALAZINE HYDROCHLORIDE 10 MG: 20 INJECTION INTRAMUSCULAR; INTRAVENOUS at 04:01

## 2025-01-18 RX ADMIN — OXYCODONE HYDROCHLORIDE AND ACETAMINOPHEN 1 TABLET: 5; 325 TABLET ORAL at 08:01

## 2025-01-18 RX ADMIN — KETOROLAC TROMETHAMINE 15 MG: 30 INJECTION, SOLUTION INTRAMUSCULAR; INTRAVENOUS at 04:01

## 2025-01-18 NOTE — ASSESSMENT & PLAN NOTE
Patient's blood pressure range in the last 24 hours was: BP  Min: 164/93  Max: 196/128.The patient's inpatient anti-hypertensive regimen is listed below:  Current Antihypertensives  losartan tablet 25 mg, Daily, Oral  NIFEdipine 24 hr tablet 30 mg, 2 times daily, Oral    Plan  - BP is uncontrolled, will adjust as follows: continue home meds  - add PRN hydralazine for SBP >180 after pain is controlled

## 2025-01-18 NOTE — HPI
Ms. Mahendra Velasco is a 44 y.o. female, with PMH of HTN, MDD, obesity, who [place] [facility] [place] due to right flank pain x 3 days. She notes associated urinary frequency, nausea and decreased appetite. She states she feels similar to the last time she had a kidney infection. She denied increased urination, pain/burning with urination, blood in urine, vomiting, CP, SOB, diarrhea, abdominal pain, hematuria, hematemesis, melena, fever, chills. She was evaluated in the ED with labs showing no leukocytosis or left shift.  Metabolic panel showed no significant abnormalities.  Urinalysis showed a nitrite negative sample with 11 wbc's/HPF, and few bacteria.  A pelvic ultrasound showed uterine fibroids.  A CT renal stone study showed no acute findings, no stone, no urinary tract or obstructive uropathy.  Again the CT demonstrated uterine fibroids.  She was treated in the ED with rocephin and anti-emetics and pain medications. She was placed on observation.

## 2025-01-18 NOTE — HOSPITAL COURSE
Mahendra Velasco was admitted for pyelonephritis, UA on admission with 11 WBC and few bacteria. Afebrile without leukocytosis, CT Renal Stone without acute process. Started on IV CTX, follow Ucx    Patient left AMA because she wanted to smoke despite being offered nicotine patch per nursing. She has capacity for medical decisions

## 2025-01-18 NOTE — SUBJECTIVE & OBJECTIVE
Interval History: Seen at bedside, reporting pain in her low back/right flank. Improved since admission but still 7/10. Will increase her pain regimen. Continue IV CTX and follow UCx    Review of Systems   Constitutional:  Negative for fatigue and fever.   Respiratory:  Negative for cough and shortness of breath.    Cardiovascular:  Negative for chest pain.   Gastrointestinal:  Positive for abdominal pain. Negative for diarrhea, nausea and vomiting.   Genitourinary:  Positive for flank pain, frequency and urgency. Negative for difficulty urinating, dysuria and hematuria.   Musculoskeletal:  Negative for back pain.   Skin:  Negative for color change and pallor.   Neurological:  Negative for dizziness, light-headedness and headaches.   Psychiatric/Behavioral:  Negative for agitation.      Objective:     Vital Signs (Most Recent):  Temp: 97.8 °F (36.6 °C) (01/18/25 0746)  Pulse: 95 (01/18/25 0746)  Resp: 16 (01/18/25 0817)  BP: (!) 184/101 (01/18/25 0746)  SpO2: 100 % (01/18/25 0746) Vital Signs (24h Range):  Temp:  [97.8 °F (36.6 °C)-98.5 °F (36.9 °C)] 97.8 °F (36.6 °C)  Pulse:  [] 95  Resp:  [14-20] 16  SpO2:  [91 %-100 %] 100 %  BP: (133-196)/() 184/101     Weight: 74.6 kg (164 lb 7.4 oz)  Body mass index is 29.13 kg/m².    Intake/Output Summary (Last 24 hours) at 1/18/2025 1122  Last data filed at 1/18/2025 0700  Gross per 24 hour   Intake 450 ml   Output 1450 ml   Net -1000 ml         Physical Exam  Vitals and nursing note reviewed.   Constitutional:       General: She is not in acute distress.     Appearance: She is well-developed and normal weight. She is not ill-appearing.   HENT:      Head: Normocephalic and atraumatic.   Neck:      Trachea: No tracheal deviation.   Cardiovascular:      Heart sounds:      No gallop.   Pulmonary:      Effort: Pulmonary effort is normal. No respiratory distress.   Abdominal:      General: Abdomen is flat.      Tenderness: There is right CVA tenderness.    Genitourinary:     Comments: + right CVA tenderness  Musculoskeletal:         General: Normal range of motion.      Cervical back: Normal range of motion.   Skin:     General: Skin is warm and dry.   Neurological:      Mental Status: She is alert.      Motor: No abnormal muscle tone.   Psychiatric:         Mood and Affect: Mood normal.         Behavior: Behavior normal.             Significant Labs: All pertinent labs within the past 24 hours have been reviewed.    Significant Imaging: I have reviewed all pertinent imaging results/findings within the past 24 hours.

## 2025-01-18 NOTE — PLAN OF CARE
completed discharge assessment with pt. Pt lives with adult son. Demographics, PCP, and insurance verified. No home health. No dialysis. Pt completes ADLs without assistance. Pt to discharge home via family transport. Pt has no other needs to be addressed at this time.    [facility] ([address])  Initial Discharge Assessment       Primary Care Provider: No primary care provider on file.    Admission Diagnosis: Flank pain [R10.9]    Admission Date: 1/17/2025  Expected Discharge Date:     Transition of Care Barriers: (P) None    Payor: MEDICAID / Plan: [employer] / Product Type: Managed Medicaid /     Extended Emergency Contact Information  Primary Emergency Contact: Moses Panda  Address: [address] [place] [address] [facility]  Home Phone: [phone]  Relation: Significant other    Discharge Plan A: (P) Home with family         [id number] [place], [place] - [address] AT [facility] [address] [facility]  [address]  [facility] [address]  Phone: [phone] Fax: [phone]    [facility]  [address] [facility] [address]  [facility] [address]  Phone: [phone] Fax: [phone]      Initial Assessment (most recent)       Adult Discharge Assessment - 01/18/25 1133          Discharge Assessment    Assessment Type Discharge Planning Assessment (P)      Confirmed/corrected address, phone number and insurance Yes (P)      Confirmed Demographics Correct on Facesheet (P)      Source of Information patient (P)      People in Home child(priscilla), adult (P)      Do you expect to return to your current living situation? Yes (P)      Do you have help at home or someone to help you manage your care at home? No (P)      Prior to hospitilization cognitive status: Alert/Oriented (P)      Current cognitive status: Alert/Oriented (P)      Walking or Climbing Stairs Difficulty no (P)      Dressing/Bathing Difficulty no (P)      Equipment  Currently Used at Home none (P)      Readmission within 30 days? No (P)      Patient currently being followed by outpatient case management? No (P)      Do you currently have service(s) that help you manage your care at home? No (P)      Do you take prescription medications? Yes (P)      Do you have prescription coverage? Yes (P)      Who is going to help you get home at discharge? MEDICAID - [employer] (P)      How do you get to doctors appointments? public transportation (P)      Are you on dialysis? No (P)      Do you take coumadin? No (P)      Discharge Plan A Home with family (P)      DME Needed Upon Discharge  none (P)      Discharge Plan discussed with: Patient (P)      Transition of Care Barriers None (P)

## 2025-01-18 NOTE — SUBJECTIVE & OBJECTIVE
Past Medical History:   Diagnosis Date    Essential (primary) hypertension 2024       Past Surgical History:   Procedure Laterality Date    bilateral tubal ligation       SECTION, CLASSIC      TUBAL LIGATION         Review of patient's allergies indicates:  No Known Allergies    No current facility-administered medications on file prior to encounter.     Current Outpatient Medications on File Prior to Encounter   Medication Sig    acetaminophen (TYLENOL) 500 MG tablet Take 2 tablets (1,000 mg total) by mouth every 8 (eight) hours as needed for Pain.    chlorthalidone (HYGROTEN) 25 MG Tab Take 1 tablet (25 mg total) by mouth once daily.    losartan (COZAAR) 25 MG tablet Take 1 tablet (25 mg total) by mouth once daily.    NIFEdipine (PROCARDIA-XL) 30 MG (OSM) 24 hr tablet Take 1 tablet (30 mg total) by mouth 2 (two) times a day.     Family History       Problem Relation (Age of Onset)    Diabetes Mother, Maternal Grandmother    Heart disease Maternal Grandmother    Hypertension Mother, Maternal Grandmother          Tobacco Use    Smoking status: Former     Current packs/day: 0.15     Average packs/day: 0.2 packs/day for 4.9 years (0.7 ttl pk-yrs)     Types: Cigarettes     Start date: 3/1/2020    Smokeless tobacco: Never   Substance and Sexual Activity    Alcohol use: Not Currently     Comment: Previously a glass of wine/week    Drug use: Yes     Frequency: 5.0 times per week     Types: Marijuana    Sexual activity: Yes     Partners: Male     Comment: daily     Review of Systems   Constitutional:  Negative for chills, diaphoresis, fatigue and fever.   Respiratory:  Negative for cough, shortness of breath and wheezing.    Cardiovascular:  Negative for chest pain and palpitations.   Gastrointestinal:  Negative for abdominal distention, abdominal pain, constipation, diarrhea, nausea and vomiting.   Genitourinary:  Positive for flank pain, frequency and urgency. Negative for difficulty urinating, dysuria and  hematuria.   Musculoskeletal:  Negative for arthralgias, back pain, myalgias, neck pain and neck stiffness.   Skin:  Negative for color change and pallor.   Neurological:  Positive for syncope. Negative for dizziness, weakness, light-headedness and headaches.   Psychiatric/Behavioral:  Negative for agitation and confusion.      Objective:     Vital Signs (Most Recent):  Temp: 98.5 °F (36.9 °C) (01/17/25 2358)  Pulse: 90 (01/18/25 0243)  Resp: 17 (01/18/25 0303)  BP: (!) 184/96 (01/18/25 0001)  SpO2: (!) 91 % (01/18/25 0001) Vital Signs (24h Range):  Temp:  [98 °F (36.7 °C)-98.5 °F (36.9 °C)] 98.5 °F (36.9 °C)  Pulse:  [] 90  Resp:  [16-20] 17  SpO2:  [91 %-100 %] 91 %  BP: (164-196)/() 184/96        Body mass index is 28.66 kg/m².     Physical Exam  Vitals and nursing note reviewed.   Constitutional:       General: She is not in acute distress.     Appearance: She is well-developed and normal weight. She is not ill-appearing, toxic-appearing or diaphoretic.   HENT:      Head: Normocephalic and atraumatic.   Eyes:      General: No scleral icterus.        Right eye: No discharge.         Left eye: No discharge.      Conjunctiva/sclera: Conjunctivae normal.   Neck:      Trachea: No tracheal deviation.   Cardiovascular:      Rate and Rhythm: Normal rate and regular rhythm.      Heart sounds: Normal heart sounds. No murmur heard.     No gallop.   Pulmonary:      Effort: Pulmonary effort is normal. No respiratory distress.      Breath sounds: Normal breath sounds. No stridor. No wheezing or rales.   Abdominal:      General: Bowel sounds are normal. There is no distension.      Palpations: Abdomen is soft. There is no mass.      Tenderness: There is no abdominal tenderness. There is no guarding.   Genitourinary:     Comments: + right CVA tenderness  Musculoskeletal:         General: No deformity. Normal range of motion.      Cervical back: Normal range of motion and neck supple.   Skin:     General: Skin is  "warm and dry.      Coloration: Skin is not pale.      Findings: No erythema or rash.   Neurological:      General: No focal deficit present.      Mental Status: She is alert and oriented to person, place, and time.      Cranial Nerves: No cranial nerve deficit.      Motor: No abnormal muscle tone.   Psychiatric:         Mood and Affect: Mood normal.         Behavior: Behavior normal.         Thought Content: Thought content normal.         Judgment: Judgment normal.                Significant Labs: All pertinent labs within the past 24 hours have been reviewed.  BMP:   Recent Labs   Lab 01/17/25  1340   *      K 4.0      CO2 24   BUN 11   CREATININE 1.1   CALCIUM 10.6*     CBC:   Recent Labs   Lab 01/17/25  1340   WBC 8.29   HGB 14.4   HCT 43.0        CMP:   Recent Labs   Lab 01/17/25  1340      K 4.0      CO2 24   *   BUN 11   CREATININE 1.1   CALCIUM 10.6*   PROT 8.5*   ALBUMIN 4.6   BILITOT 0.3   ALKPHOS 73   AST 14   ALT 16   ANIONGAP 13     Urine Culture: No results for input(s): "LABURIN" in the last 48 hours.  Urine Studies:   Recent Labs   Lab 01/17/25  1437   COLORU Yellow   APPEARANCEUA Clear   PHUR 6.0   SPECGRAV 1.020   PROTEINUA Negative   GLUCUA Negative   KETONESU Negative   BILIRUBINUA Negative   OCCULTUA 1+*   NITRITE Negative   UROBILINOGEN Negative   LEUKOCYTESUR Trace*   RBCUA 5*   WBCUA 11*   BACTERIA Few*   SQUAMEPITHEL 1       Significant Imaging: I have reviewed all pertinent imaging results/findings within the past 24 hours.  Imaging Results              US Pelvis Comp with Transvag NON-OB (xpd) (Final result)  Result time 01/17/25 18:46:09   Procedure changed from US Transvaginal Non OB     Final result by Gonzalo Gallegos MD (01/17/25 18:46:09)                   Impression:      Leiomyomatous uterus.    No findings to suggest vascular compromise to the right ovary.  The left ovary is not identified.      Electronically signed by: Gonzalo " MD El  Date:    01/17/2025  Time:    18:46               Narrative:    EXAMINATION:  US PELVIS COMP WITH TRANSVAG NON-OB (XPD)    CLINICAL HISTORY:  ovarian torsion; Torsion of ovary and ovarian pedicle, unspecified side    TECHNIQUE:  Transabdominal sonography of the pelvis was performed, followed by transvaginal sonography to better evaluate the uterus and ovaries.    COMPARISON:  11/08/2012, CT renal stone study 01/17/2025    FINDINGS:  The uterus measures approximately 9.0 x 3.5 x 6.0 cm.  Uterine leiomyoma measure approximately 3.6 x 3.5 x 3.6 cm and 2.0 x 2.0 x 2.3 cm.  The endometrial stripe is obscured by artifact from leiomyoma.  Cervix is unremarkable.    The right ovary measures approximately 2.3 x 1.3 x 2.2 cm.  The left ovary is not identified.  No significant free fluid in the pelvis.  Arterial and venous flow is documented to the right ovary.                                        CT Renal Stone Study ABD Pelvis WO (Final result)  Result time 01/17/25 16:09:31      Final result by Leonard Anderson MD (01/17/25 16:09:31)                   Impression:      1. No acute process or CT findings to explain patient's symptoms of flank pain on this noncontrast study.  Specifically, no evidence of radiopaque calculus within the urinary tract or obstructive uropathy.  2. Hepatomegaly.  3. Systemic calcific atherosclerosis, somewhat age advanced.  4. Suspected uterine fibroids.  5. Medial right breast partially imaged 1.9 cm mass, incompletely characterized on this noncontrast study.  Suggest dedicated mammographic evaluation, as warranted.  This report was flagged in Epic as containing an incidental finding.      Electronically signed by: Leonard Anderson MD  Date:    01/17/2025  Time:    16:09               Narrative:    EXAMINATION:  CT RENAL STONE STUDY ABD PELVIS WO    CLINICAL HISTORY:  Flank pain, kidney stone suspected;    TECHNIQUE:  Low dose axial images, sagittal and coronal reformations were  obtained from the lung bases to the pubic symphysis.  Contrast was not administered.    COMPARISON:  CT abdomen and pelvis most recent 03/08/2024, right upper quadrant ultrasound 02/21/2024, pelvic ultrasound 11/08/2012    FINDINGS:  Lack of IV contrast limits evaluation of soft tissue and vascular structures.    Imaged lung bases are clear.  Base of the heart is normal in size noting trace nonspecific pericardial fluid similar to prior.    Liver measures 18.7 cm in length without focal process.    Noncontrast appearance of the gallbladder, pancreas, spleen, stomach, duodenum and bilateral adrenal glands are within normal limits.  No significant biliary ductal dilatation.    Bilateral kidneys are normal in size, shape and location.  No radiopaque calculus seen within the urinary tract.  No hydronephrosis or significant perinephric stranding.  Ureters are normal in course and caliber.  Bladder is suboptimally distended.  Uterus is anteflexed and tilted towards the right and appears prominent with lobulated contour of the upper body and fundus suggesting underlying fibroids.  No adnexal mass or significant volume free pelvic fluid.  Pelvic phleboliths noted.    Appendix and terminal ileum are within normal limits.  Grossly similar mild nonspecific mural fat deposition of the cecum and scattered areas throughout the colon without adjacent inflammatory change likely sequela of remote infectious or inflammatory process or prior laxative use.  No evidence of bowel obstruction or acute inflammation.  No bowel pneumatosis or portal venous gas.    No ascites, free air or lymphadenopathy by CT criteria.    Mild to moderate scattered calcific atherosclerosis of the abdominal aorta extending into its iliac branches, somewhat advanced for age.  Aorta tapers normally.    At the medial aspect of the mid to upper right breast there is an approximate 1.9 cm well-circumscribed homogeneous mass with average 39 Hounsfield units,  incompletely characterized on this noncontrast study.  No adjacent inflammatory change.  Extraperitoneal soft tissues are within normal limits.    Osseous structures appear stable without acute findings.

## 2025-01-18 NOTE — H&P
[facility] [address] Medicine  History & Physical    Patient Name: Mahendra Velasco  MRN: [id number]  Patient Class: OP- Observation  Admission Date: 2025  Attending Physician: Marcus Pastrana MD   Primary Care Provider: No primary care provider on file.         Patient information was obtained from patient, past medical records, and ER records.     Subjective:     Principal Problem:Right flank pain    Chief Complaint:   Chief Complaint   Patient presents with    Flank Pain     R flank pain and nausea since Tuesday along with frequent urination.        HPI: Ms. Mahendra Velasco is a 44 y.o. female, with PMH of HTN, MDD, obesity, who [place] [facility] [place] due to right flank pain x 3 days. She notes associated urinary frequency, nausea and decreased appetite. She states she feels similar to the last time she had a kidney infection. She denied increased urination, pain/burning with urination, blood in urine, vomiting, CP, SOB, diarrhea, abdominal pain, hematuria, hematemesis, melena, fever, chills. She was evaluated in the ED with labs showing no leukocytosis or left shift.  Metabolic panel showed no significant abnormalities.  Urinalysis showed a nitrite negative sample with 11 wbc's/HPF, and few bacteria.  A pelvic ultrasound showed uterine fibroids.  A CT renal stone study showed no acute findings, no stone, no urinary tract or obstructive uropathy.  Again the CT demonstrated uterine fibroids.  She was treated in the ED with rocephin and anti-emetics and pain medications. She was placed on observation.     Past Medical History:   Diagnosis Date    Essential (primary) hypertension 2024       Past Surgical History:   Procedure Laterality Date    bilateral tubal ligation       SECTION, CLASSIC      TUBAL LIGATION         Review of patient's allergies indicates:  No Known Allergies    No current facility-administered medications on file prior to encounter.     Current  Outpatient Medications on File Prior to Encounter   Medication Sig    acetaminophen (TYLENOL) 500 MG tablet Take 2 tablets (1,000 mg total) by mouth every 8 (eight) hours as needed for Pain.    chlorthalidone (HYGROTEN) 25 MG Tab Take 1 tablet (25 mg total) by mouth once daily.    losartan (COZAAR) 25 MG tablet Take 1 tablet (25 mg total) by mouth once daily.    NIFEdipine (PROCARDIA-XL) 30 MG (OSM) 24 hr tablet Take 1 tablet (30 mg total) by mouth 2 (two) times a day.     Family History       Problem Relation (Age of Onset)    Diabetes Mother, Maternal Grandmother    Heart disease Maternal Grandmother    Hypertension Mother, Maternal Grandmother          Tobacco Use    Smoking status: Former     Current packs/day: 0.15     Average packs/day: 0.2 packs/day for 4.9 years (0.7 ttl pk-yrs)     Types: Cigarettes     Start date: 3/1/2020    Smokeless tobacco: Never   Substance and Sexual Activity    Alcohol use: Not Currently     Comment: Previously a glass of wine/week    Drug use: Yes     Frequency: 5.0 times per week     Types: Marijuana    Sexual activity: Yes     Partners: Male     Comment: daily     Review of Systems   Constitutional:  Negative for chills, diaphoresis, fatigue and fever.   Respiratory:  Negative for cough, shortness of breath and wheezing.    Cardiovascular:  Negative for chest pain and palpitations.   Gastrointestinal:  Negative for abdominal distention, abdominal pain, constipation, diarrhea, nausea and vomiting.   Genitourinary:  Positive for flank pain, frequency and urgency. Negative for difficulty urinating, dysuria and hematuria.   Musculoskeletal:  Negative for arthralgias, back pain, myalgias, neck pain and neck stiffness.   Skin:  Negative for color change and pallor.   Neurological:  Positive for syncope. Negative for dizziness, weakness, light-headedness and headaches.   Psychiatric/Behavioral:  Negative for agitation and confusion.      Objective:     Vital Signs (Most Recent):  Temp:  98.5 °F (36.9 °C) (01/17/25 2358)  Pulse: 90 (01/18/25 0243)  Resp: 17 (01/18/25 0303)  BP: (!) 184/96 (01/18/25 0001)  SpO2: (!) 91 % (01/18/25 0001) Vital Signs (24h Range):  Temp:  [98 °F (36.7 °C)-98.5 °F (36.9 °C)] 98.5 °F (36.9 °C)  Pulse:  [] 90  Resp:  [16-20] 17  SpO2:  [91 %-100 %] 91 %  BP: (164-196)/() 184/96        Body mass index is 28.66 kg/m².     Physical Exam  Vitals and nursing note reviewed.   Constitutional:       General: She is not in acute distress.     Appearance: She is well-developed and normal weight. She is not ill-appearing, toxic-appearing or diaphoretic.   HENT:      Head: Normocephalic and atraumatic.   Eyes:      General: No scleral icterus.        Right eye: No discharge.         Left eye: No discharge.      Conjunctiva/sclera: Conjunctivae normal.   Neck:      Trachea: No tracheal deviation.   Cardiovascular:      Rate and Rhythm: Normal rate and regular rhythm.      Heart sounds: Normal heart sounds. No murmur heard.     No gallop.   Pulmonary:      Effort: Pulmonary effort is normal. No respiratory distress.      Breath sounds: Normal breath sounds. No stridor. No wheezing or rales.   Abdominal:      General: Bowel sounds are normal. There is no distension.      Palpations: Abdomen is soft. There is no mass.      Tenderness: There is no abdominal tenderness. There is no guarding.   Genitourinary:     Comments: + right CVA tenderness  Musculoskeletal:         General: No deformity. Normal range of motion.      Cervical back: Normal range of motion and neck supple.   Skin:     General: Skin is warm and dry.      Coloration: Skin is not pale.      Findings: No erythema or rash.   Neurological:      General: No focal deficit present.      Mental Status: She is alert and oriented to person, place, and time.      Cranial Nerves: No cranial nerve deficit.      Motor: No abnormal muscle tone.   Psychiatric:         Mood and Affect: Mood normal.         Behavior: Behavior  "normal.         Thought Content: Thought content normal.         Judgment: Judgment normal.                Significant Labs: All pertinent labs within the past 24 hours have been reviewed.  BMP:   Recent Labs   Lab 01/17/25  1340   *      K 4.0      CO2 24   BUN 11   CREATININE 1.1   CALCIUM 10.6*     CBC:   Recent Labs   Lab 01/17/25  1340   WBC 8.29   HGB 14.4   HCT 43.0        CMP:   Recent Labs   Lab 01/17/25  1340      K 4.0      CO2 24   *   BUN 11   CREATININE 1.1   CALCIUM 10.6*   PROT 8.5*   ALBUMIN 4.6   BILITOT 0.3   ALKPHOS 73   AST 14   ALT 16   ANIONGAP 13     Urine Culture: No results for input(s): "LABURIN" in the last 48 hours.  Urine Studies:   Recent Labs   Lab 01/17/25  1437   COLORU Yellow   APPEARANCEUA Clear   PHUR 6.0   SPECGRAV 1.020   PROTEINUA Negative   GLUCUA Negative   KETONESU Negative   BILIRUBINUA Negative   OCCULTUA 1+*   NITRITE Negative   UROBILINOGEN Negative   LEUKOCYTESUR Trace*   RBCUA 5*   WBCUA 11*   BACTERIA Few*   SQUAMEPITHEL 1       Significant Imaging: I have reviewed all pertinent imaging results/findings within the past 24 hours.  Imaging Results              US Pelvis Comp with Transvag NON-OB (xpd) (Final result)  Result time 01/17/25 18:46:09   Procedure changed from US Transvaginal Non OB     Final result by Gonzalo Gallegos MD (01/17/25 18:46:09)                   Impression:      Leiomyomatous uterus.    No findings to suggest vascular compromise to the right ovary.  The left ovary is not identified.      Electronically signed by: Gonzalo Gallegos MD  Date:    01/17/2025  Time:    18:46               Narrative:    EXAMINATION:  US PELVIS COMP WITH TRANSVAG NON-OB (XPD)    CLINICAL HISTORY:  ovarian torsion; Torsion of ovary and ovarian pedicle, unspecified side    TECHNIQUE:  Transabdominal sonography of the pelvis was performed, followed by transvaginal sonography to better evaluate the uterus and " ovaries.    COMPARISON:  11/08/2012, CT renal stone study 01/17/2025    FINDINGS:  The uterus measures approximately 9.0 x 3.5 x 6.0 cm.  Uterine leiomyoma measure approximately 3.6 x 3.5 x 3.6 cm and 2.0 x 2.0 x 2.3 cm.  The endometrial stripe is obscured by artifact from leiomyoma.  Cervix is unremarkable.    The right ovary measures approximately 2.3 x 1.3 x 2.2 cm.  The left ovary is not identified.  No significant free fluid in the pelvis.  Arterial and venous flow is documented to the right ovary.                                        CT Renal Stone Study ABD Pelvis WO (Final result)  Result time 01/17/25 16:09:31      Final result by Leonard Anderson MD (01/17/25 16:09:31)                   Impression:      1. No acute process or CT findings to explain patient's symptoms of flank pain on this noncontrast study.  Specifically, no evidence of radiopaque calculus within the urinary tract or obstructive uropathy.  2. Hepatomegaly.  3. Systemic calcific atherosclerosis, somewhat age advanced.  4. Suspected uterine fibroids.  5. Medial right breast partially imaged 1.9 cm mass, incompletely characterized on this noncontrast study.  Suggest dedicated mammographic evaluation, as warranted.  This report was flagged in Epic as containing an incidental finding.      Electronically signed by: Leonard Anderson MD  Date:    01/17/2025  Time:    16:09               Narrative:    EXAMINATION:  CT RENAL STONE STUDY ABD PELVIS WO    CLINICAL HISTORY:  Flank pain, kidney stone suspected;    TECHNIQUE:  Low dose axial images, sagittal and coronal reformations were obtained from the lung bases to the pubic symphysis.  Contrast was not administered.    COMPARISON:  CT abdomen and pelvis most recent 03/08/2024, right upper quadrant ultrasound 02/21/2024, pelvic ultrasound 11/08/2012    FINDINGS:  Lack of IV contrast limits evaluation of soft tissue and vascular structures.    Imaged lung bases are clear.  Base of the heart is normal  in size noting trace nonspecific pericardial fluid similar to prior.    Liver measures 18.7 cm in length without focal process.    Noncontrast appearance of the gallbladder, pancreas, spleen, stomach, duodenum and bilateral adrenal glands are within normal limits.  No significant biliary ductal dilatation.    Bilateral kidneys are normal in size, shape and location.  No radiopaque calculus seen within the urinary tract.  No hydronephrosis or significant perinephric stranding.  Ureters are normal in course and caliber.  Bladder is suboptimally distended.  Uterus is anteflexed and tilted towards the right and appears prominent with lobulated contour of the upper body and fundus suggesting underlying fibroids.  No adnexal mass or significant volume free pelvic fluid.  Pelvic phleboliths noted.    Appendix and terminal ileum are within normal limits.  Grossly similar mild nonspecific mural fat deposition of the cecum and scattered areas throughout the colon without adjacent inflammatory change likely sequela of remote infectious or inflammatory process or prior laxative use.  No evidence of bowel obstruction or acute inflammation.  No bowel pneumatosis or portal venous gas.    No ascites, free air or lymphadenopathy by CT criteria.    Mild to moderate scattered calcific atherosclerosis of the abdominal aorta extending into its iliac branches, somewhat advanced for age.  Aorta tapers normally.    At the medial aspect of the mid to upper right breast there is an approximate 1.9 cm well-circumscribed homogeneous mass with average 39 Hounsfield units, incompletely characterized on this noncontrast study.  No adjacent inflammatory change.  Extraperitoneal soft tissues are within normal limits.    Osseous structures appear stable without acute findings.                                       Assessment/Plan:     * Right flank pain  - Mr. Mahendra Velasco presents with right flank pain w/ nausea & urinary frequency x 3 days   -  UA with 22 WBC/hpf, few bacteria and trace leuk esterase   - s/p rocephin in ED, continue   - urine cultures pending   - PRN meds for pain/nausea ordered     Essential hypertension  Patient's blood pressure range in the last 24 hours was: BP  Min: 164/93  Max: 196/128.The patient's inpatient anti-hypertensive regimen is listed below:  Current Antihypertensives  losartan tablet 25 mg, Daily, Oral  NIFEdipine 24 hr tablet 30 mg, 2 times daily, Oral    Plan  - BP is uncontrolled, will adjust as follows: continue home meds  - add PRN hydralazine for SBP >180 after pain is controlled         VTE Risk Mitigation (From admission, onward)           Ordered     IP VTE LOW RISK PATIENT  Once         01/18/25 0038     Place sequential compression device  Until discontinued         01/18/25 0038                         On 01/18/2025, patient should be placed in hospital observation services under the care of Dr. Marcus Pastrana MD.           NABOR RyderC  Department of Hospital Medicine  [facility] ([address])

## 2025-01-18 NOTE — PROGRESS NOTES
[facility] [address] Medicine  Progress Note    Patient Name: Mahendra Velasco  MRN: [id number]  Patient Class: OP- Observation   Admission Date: 1/17/2025  Length of Stay: 0 days  Attending Physician: TAYA Pearl MD  Primary Care Provider: No primary care provider on file.        Subjective     Principal Problem:Pyelonephritis        HPI:  Ms. Mahendra Velasco is a 44 y.o. female, with PMH of HTN, MDD, obesity, who presented to [facility] ED on 1/17/25 due to right flank pain x 3 days. She notes associated urinary frequency, nausea and decreased appetite. She states she feels similar to the last time she had a kidney infection. She denied increased urination, pain/burning with urination, blood in urine, vomiting, CP, SOB, diarrhea, abdominal pain, hematuria, hematemesis, melena, fever, chills. She was evaluated in the ED with labs showing no leukocytosis or left shift.  Metabolic panel showed no significant abnormalities.  Urinalysis showed a nitrite negative sample with 11 wbc's/HPF, and few bacteria.  A pelvic ultrasound showed uterine fibroids.  A CT renal stone study showed no acute findings, no stone, no urinary tract or obstructive uropathy.  Again the CT demonstrated uterine fibroids.  She was treated in the ED with rocephin and anti-emetics and pain medications. She was placed on observation.     Overview/Hospital Course:  Mahendra Velasco was admitted for pyelonephritis, UA on admission with 11 WBC and few bacteria. Afebrile without leukocytosis, CT Renal Stone without acute process. Started on IV CTX, follow UCx    Interval History: Seen at bedside, reporting pain in her low back/right flank. Improved since admission but still 7/10. Will increase her pain regimen. Continue IV CTX and follow UCx    Review of Systems   Constitutional:  Negative for fatigue and fever.   Respiratory:  Negative for cough and shortness of breath.    Cardiovascular:  Negative for chest pain.   Gastrointestinal:   Positive for abdominal pain. Negative for diarrhea, nausea and vomiting.   Genitourinary:  Positive for flank pain, frequency and urgency. Negative for difficulty urinating, dysuria and hematuria.   Musculoskeletal:  Negative for back pain.   Skin:  Negative for color change and pallor.   Neurological:  Negative for dizziness, light-headedness and headaches.   Psychiatric/Behavioral:  Negative for agitation.      Objective:     Vital Signs (Most Recent):  Temp: 97.8 °F (36.6 °C) (01/18/25 0746)  Pulse: 95 (01/18/25 0746)  Resp: 16 (01/18/25 0817)  BP: (!) 184/101 (01/18/25 0746)  SpO2: 100 % (01/18/25 0746) Vital Signs (24h Range):  Temp:  [97.8 °F (36.6 °C)-98.5 °F (36.9 °C)] 97.8 °F (36.6 °C)  Pulse:  [] 95  Resp:  [14-20] 16  SpO2:  [91 %-100 %] 100 %  BP: (133-196)/() 184/101     Weight: 74.6 kg (164 lb 7.4 oz)  Body mass index is 29.13 kg/m².    Intake/Output Summary (Last 24 hours) at 1/18/2025 1122  Last data filed at 1/18/2025 0700  Gross per 24 hour   Intake 450 ml   Output 1450 ml   Net -1000 ml         Physical Exam  Vitals and nursing note reviewed.   Constitutional:       General: She is not in acute distress.     Appearance: She is well-developed and normal weight. She is not ill-appearing.   HENT:      Head: Normocephalic and atraumatic.   Neck:      Trachea: No tracheal deviation.   Cardiovascular:      Heart sounds:      No gallop.   Pulmonary:      Effort: Pulmonary effort is normal. No respiratory distress.   Abdominal:      General: Abdomen is flat.      Tenderness: There is right CVA tenderness.   Genitourinary:     Comments: + right CVA tenderness  Musculoskeletal:         General: Normal range of motion.      Cervical back: Normal range of motion.   Skin:     General: Skin is warm and dry.   Neurological:      Mental Status: She is alert.      Motor: No abnormal muscle tone.   Psychiatric:         Mood and Affect: Mood normal.         Behavior: Behavior normal.              Significant Labs: All pertinent labs within the past 24 hours have been reviewed.    Significant Imaging: I have reviewed all pertinent imaging results/findings within the past 24 hours.    Assessment and Plan     * Pyelonephritis  - Mr. Mahendra Velasco presents with right flank pain w/ nausea & urinary frequency x 3 days   - UA with 22 WBC/hpf, few bacteria and trace leuk esterase   - s/p rocephin in ED, continue   - urine cultures pending   - PRN meds for pain/nausea ordered     Essential hypertension  Patient's blood pressure range in the last 24 hours was: BP  Min: 164/93  Max: 196/128.The patient's inpatient anti-hypertensive regimen is listed below:  Current Antihypertensives  losartan tablet 25 mg, Daily, Oral  NIFEdipine 24 hr tablet 30 mg, 2 times daily, Oral    Plan  - BP is uncontrolled, will adjust as follows: continue home meds  - add PRN hydralazine for SBP >180 after pain is controlled         VTE Risk Mitigation (From admission, onward)           Ordered     IP VTE LOW RISK PATIENT  Once         01/18/25 0038     Place sequential compression device  Until discontinued         01/18/25 0038                    Discharge Planning   VIKTOR:      Code Status: Full Code   Medical Readiness for Discharge Date:                            Maia Sanders PA-C  Department of Hospital Medicine   [facility] - [facility] Surg ([address])

## 2025-01-18 NOTE — ASSESSMENT & PLAN NOTE
- Mr. Mahendra Velasco presents with right flank pain w/ nausea & urinary frequency x 3 days   - UA with 22 WBC/hpf, few bacteria and trace leuk esterase   - s/p rocephin in ED, continue   - urine cultures pending   - PRN meds for pain/nausea ordered

## 2025-01-18 NOTE — PLAN OF CARE
Problem: Adult Inpatient Plan of Care  Goal: Optimal Comfort and Wellbeing  Outcome: Progressing     Problem: Adult Inpatient Plan of Care  Goal: Absence of Hospital-Acquired Illness or Injury  Outcome: Progressing     Problem: Adult Inpatient Plan of Care  Goal: Readiness for Transition of Care  Outcome: Progressing

## 2025-01-19 LAB — BACTERIA UR CULT: NORMAL

## 2025-01-29 NOTE — DISCHARGE SUMMARY
[facility] S[facility] [address] Medicine  Discharge Summary      Patient Name: Mahendra Velasco  MRN: [id number]  [facility]: [id number]  Patient Class: IP- Inpatient  Admission Date: 1/17/2025  Hospital Length of Stay: 1 days  Discharge Date and Time: 1/18/2025  5:30 PM  Attending Physician: No att. providers found   Discharging Provider: Maia Sanders PA-C  Primary Care Provider: No primary care provider on file.    Primary Care Team: Networked reference to record PCT     HPI:   Ms. Mahendra Velasco is a 44 y.o. female, with PMH of HTN, MDD, obesity, who presented to [facility] ED on 1/17/25 due to right flank pain x 3 days. She notes associated urinary frequency, nausea and decreased appetite. She states she feels similar to the last time she had a kidney infection. She denied increased urination, pain/burning with urination, blood in urine, vomiting, CP, SOB, diarrhea, abdominal pain, hematuria, hematemesis, melena, fever, chills. She was evaluated in the ED with labs showing no leukocytosis or left shift.  Metabolic panel showed no significant abnormalities.  Urinalysis showed a nitrite negative sample with 11 wbc's/HPF, and few bacteria.  A pelvic ultrasound showed uterine fibroids.  A CT renal stone study showed no acute findings, no stone, no urinary tract or obstructive uropathy.  Again the CT demonstrated uterine fibroids.  She was treated in the ED with rocephin and anti-emetics and pain medications. She was placed on observation.     * No surgery found *      Hospital Course:   Mahendra Velasco was admitted for pyelonephritis, UA on admission with 11 WBC and few bacteria. Afebrile without leukocytosis, CT Renal Stone without acute process. Started on IV CTX, follow Ucx    Patient left AMA because she wanted to smoke despite being offered nicotine patch per nursing. She has capacity for medical decisions     Goals of Care Treatment Preferences:  Code Status: Full Code         Consults:     * Pyelonephritis  -  Mr. Mahendra Velasco presents with right flank pain w/ nausea & urinary frequency x 3 days   - UA with 22 WBC/hpf, few bacteria and trace leuk esterase   - s/p rocephin in ED, continue   - urine cultures pending   - PRN meds for pain/nausea ordered   Left AMA    Essential hypertension  Patient's blood pressure range in the last 24 hours was: BP  Min: 164/93  Max: 196/128.The patient's inpatient anti-hypertensive regimen is listed below:  Current Antihypertensives  losartan tablet 25 mg, Daily, Oral  NIFEdipine 24 hr tablet 30 mg, 2 times daily, Oral    Plan  - BP is uncontrolled, will adjust as follows: continue home meds  - add PRN hydralazine for SBP >180 after pain is controlled         Final Active Diagnoses:    Diagnosis Date Noted POA    PRINCIPAL PROBLEM:  Pyelonephritis [N12] 01/17/2025 Yes    Essential hypertension [I10] 03/07/2023 Yes      Problems Resolved During this Admission:       Discharged Condition: against medical advice    Disposition: Left Against Medical Adv*    Follow Up:    Patient Instructions:   No discharge procedures on file.    Significant Diagnostic Studies: Microbiology: Urine Culture    Lab Results   Component Value Date    LABURIN No significant growth 01/17/2025     Radiology: CT scan: RSS: 1. No acute process or CT findings to explain patient's symptoms of flank pain on this noncontrast study.  Specifically, no evidence of radiopaque calculus within the urinary tract or obstructive uropathy.  2. Hepatomegaly.  3. Systemic calcific atherosclerosis, somewhat age advanced.  4. Suspected uterine fibroids.  5. Medial right breast partially imaged 1.9 cm mass, incompletely characterized on this noncontrast study.  Suggest dedicated mammographic evaluation, as warranted.    Pending Diagnostic Studies:       None           Medications:  Reconciled Home Medications:      Medication List        ASK your doctor about these medications      acetaminophen 500 MG tablet  Commonly known as:  TYLENOL  Take 2 tablets (1,000 mg total) by mouth every 8 (eight) hours as needed for Pain.     chlorthalidone 25 MG Tab  Commonly known as: HYGROTEN  Take 1 tablet (25 mg total) by mouth once daily.     losartan 25 MG tablet  Commonly known as: COZAAR  Take 1 tablet (25 mg total) by mouth once daily.     NIFEdipine 30 MG (OSM) 24 hr tablet  Commonly known as: PROCARDIA-XL  Take 1 tablet (30 mg total) by mouth 2 (two) times a day.              Indwelling Lines/Drains at time of discharge:   Lines/Drains/Airways       None                   Time spent on the discharge of patient: >45 minutes         Maia Sanders PA-C  Department of Hospital Medicine  [facility] - Med Surg ([address])

## 2025-01-29 NOTE — ASSESSMENT & PLAN NOTE
- Mr. Mahendra Velasco presents with right flank pain w/ nausea & urinary frequency x 3 days   - UA with 22 WBC/hpf, few bacteria and trace leuk esterase   - s/p rocephin in ED, continue   - urine cultures pending   - PRN meds for pain/nausea ordered   Left AMA